# Patient Record
Sex: MALE | Race: WHITE | NOT HISPANIC OR LATINO | Employment: OTHER | URBAN - METROPOLITAN AREA
[De-identification: names, ages, dates, MRNs, and addresses within clinical notes are randomized per-mention and may not be internally consistent; named-entity substitution may affect disease eponyms.]

---

## 2018-01-23 ENCOUNTER — OFFICE VISIT CONVERTED (OUTPATIENT)
Dept: PULMONOLOGY | Facility: CLINIC | Age: 53
End: 2018-01-23
Attending: INTERNAL MEDICINE

## 2018-03-02 ENCOUNTER — OFFICE VISIT CONVERTED (OUTPATIENT)
Dept: SURGERY | Facility: CLINIC | Age: 53
End: 2018-03-02
Attending: SURGERY

## 2018-03-13 ENCOUNTER — OFFICE VISIT CONVERTED (OUTPATIENT)
Dept: PULMONOLOGY | Facility: CLINIC | Age: 53
End: 2018-03-13
Attending: INTERNAL MEDICINE

## 2018-12-19 ENCOUNTER — CONVERSION ENCOUNTER (OUTPATIENT)
Dept: OTHER | Facility: HOSPITAL | Age: 53
End: 2018-12-19

## 2019-02-08 ENCOUNTER — OFFICE VISIT CONVERTED (OUTPATIENT)
Dept: CARDIOLOGY | Facility: CLINIC | Age: 54
End: 2019-02-08
Attending: INTERNAL MEDICINE

## 2021-05-15 VITALS
DIASTOLIC BLOOD PRESSURE: 86 MMHG | SYSTOLIC BLOOD PRESSURE: 144 MMHG | HEART RATE: 72 BPM | WEIGHT: 228 LBS | HEIGHT: 67 IN | BODY MASS INDEX: 35.79 KG/M2

## 2021-05-15 VITALS
DIASTOLIC BLOOD PRESSURE: 80 MMHG | SYSTOLIC BLOOD PRESSURE: 134 MMHG | BODY MASS INDEX: 35.63 KG/M2 | HEART RATE: 100 BPM | WEIGHT: 227 LBS | HEIGHT: 67 IN

## 2021-05-16 VITALS — RESPIRATION RATE: 14 BRPM | HEIGHT: 67 IN | WEIGHT: 212 LBS | BODY MASS INDEX: 33.27 KG/M2

## 2021-05-28 VITALS
SYSTOLIC BLOOD PRESSURE: 156 MMHG | TEMPERATURE: 98.7 F | DIASTOLIC BLOOD PRESSURE: 95 MMHG | SYSTOLIC BLOOD PRESSURE: 132 MMHG | BODY MASS INDEX: 34.92 KG/M2 | BODY MASS INDEX: 33.2 KG/M2 | WEIGHT: 222.5 LBS | RESPIRATION RATE: 16 BRPM | OXYGEN SATURATION: 100 % | OXYGEN SATURATION: 100 % | HEIGHT: 67 IN | DIASTOLIC BLOOD PRESSURE: 84 MMHG | HEIGHT: 67 IN | HEART RATE: 92 BPM | HEART RATE: 93 BPM | RESPIRATION RATE: 12 BRPM | TEMPERATURE: 98 F | WEIGHT: 211.56 LBS

## 2021-05-28 NOTE — PROGRESS NOTES
Patient: ELBA HINES     Acct: [id number]     Report: #[id number]  UNIT #: [id number]     : 1965    Encounter Date:2018  PRIMARY CARE: Richard Bello  ***Signed***  --------------------------------------------------------------------------------------------------------------------  Chief Complaint      Encounter Date      2018            Referring Provider      Richard Bello            Patient Complaint      Patient is complaining of      2 month follow up            VITALS      Height 5 ft 7 in / 170.18 cm      Weight 211 lbs 9 oz / 95.484911 kg      BSA 2.16 m2      BMI 33.1 kg/m2      Temperature 98.0 F / 36.67 C - Oral      Pulse 93      Respirations 16      Blood Pressure 132/84 Sitting, Right Arm      Pulse Oximetry 100%, room air      Exhaled Nitrous Oxide Testin            HPI      The patient is a very pleasant 52 year old  male cigarettes smoker     with asthma/chronic obstructive pulmonary disease overlap syndrome here for     follow up.             Since his last office visit I put him on Perforomist, Pulmicort and Spiriva. He     still continues to smoke. He still has significant respiratory symptoms. The     last time I saw him he was treated for an asthma exacerbation. He still gets     short of breath walking about 100-150 feet, worse with exertion, relieved with     rest. He has chronic cough productive of clear thin sputum. All symptoms are     worse with exertion and relieved with nebulizers. He is seasonal allergies are     well controlled and he denies any itchy, scratchy throat and watery eyes. He     has bad rhinorrhea with nasal congestion as well as a salty taste in the back     of his throat with postnasal drip. His cough is worse with nasal stuffiness. He     is not on any nasal sprays. He had previously seen allergy and immunology for     allergy shots before. Since his last office visit I did a CBC showing     peripheral eosinophilia. I also did an IgE  which was 724. He denies any     exposures to mold, hay or dust but has no CT scan evidence of bronchiectasis     and has never been evaluated for allergic bronchopulmonary aspergillosis. He     denies any nausea and vomiting, fevers or chills, headaches or hemoptysis or     chest pain. He denies any swollen lymph nodes or glands in her head and neck.     He still smokes half a pack of cigarettes a day and has done so for 40 years     and has no interest in quitting.             I have personally reviewed the Review of Systems, past family, social, surgical     and medical histories and I agree with the findings.            ROS      Constitutional:  Complains of: Fatigue, Denies: Fever, Weight gain, Weight loss    , Chills, Insomnia, Other      Respiratory/Breathing:  Complains of: Shortness of air, Wheezing, Cough, Denies    : Hemoptysis, Pleuritic pain, Other      Endocrine:  Denies: Polydipsia, Polyuria, Heat/cold intolerance, Diabetes, Other      Eyes:  Denies: Blurred vision, Vision Changes, Other      Ears, nose, mouth, throat:  Denies: Mouth lesions, Thrush, Throat pain,     Hoarseness, Allergies/Hay Fever, Post Nasal Drip, Headaches, Recent Head Injury    , Nose Bleeding, Neck Stiffness, Thyroid Mass, Hearing Loss, Ear Fullness, Dry     Mouth, Nasal or Sinus Pain, Dry Lips, Nasal discharge, Nasal congestion, Other      Cardiovascular:  Denies: Palpitations, Syncope, Claudication, Chest Pain, Wake     up Gasping for air, Leg Swelling, Irregular Heart Rate, Cyanosis, Dyspnea on     Exertion, Other      Gastrointestinal:  Denies: Nausea, Constipation, Diarrhea, Abdominal pain,     Vomiting, Difficulty Swallowing, Reflux/Heartburn, Dysphagia, Jaundice, Bloating    , Melena, Bloody stools, Other      Genitourinary:  Denies: Urinary frequency, Incontinence, Hematuria, Urgency,     Nocturia, Dysuria, Testicular problems, Other      Musculoskeletal:  Denies: Joint Pain, Joint Stiffness, Joint Swelling, Myalgias    ,  Other      Hematologic/lymphatic:  DENIES: Lymphadenopathy, Bruising, Bleeding tendencies,     Other      Neurological:  Denies: Headache, Numbness, Weakness, Seizures, Other      Psychiatric:  Denies: Anxiety, Appropriate Effect, Depression, Other      Sleep:  No: Excessive daytime sleep, Morning Headache?, Snoring, Insomnia?,     Stop breathing at sleep?, Other      Integumentary:  Denies: Rash, Dry skin, Skin Warm to Touch, Other      Immunologic/Allergic:  Denies: Latex allergy, Seasonal allergies, Asthma,     Urticaria, Eczema, Other      Immunization status:  No: Up to date            FAMILY/SOCIAL/MEDICAL HX      Surgical History:  No: AAA Repair, Abdominal Surgery, Angioplasty, Appendectomy    , Back Surgery, Bladder Surgery, Bowel Surgery, Breast Surgery, CABG, Carotid     Stenosis, Cholecystectomy, Ear Surgery, Eye Surgery, Head Surgery, Hernia     Surgery, Kidney Surgery, Nose Surgery, Oral Surgery, Orthopedic Surgery,     Prostatectomy, Rectal Surgery, Spinal Surgery, Testicular Surgery, Throat     Surgery, Valve Replacement, Vascular Surgery, Other Surgeries      Stroke - Family Hx:  Grandparent      Heart - Family Hx:  Father, Brother      Cancer/Type - Family Hx:  Aunt, Uncle, Cousin      Is Mother Still Living?:  Yes      Social History:  Tobacco Use, No Alcohol Use, No Recreational Drug use      Smoking status:  Current every day smoker (10 cigs per day x 40 y )      Anticoagulation Therapy:  No      Antibiotic Prophylaxis:  No      Medical History:  Yes: Asthma, Chronic Bronchitis/COPD, High Blood Pressure,     High Cholesterol, Shortness Of Breath, No: Alcoholism, Allergies, Anemia,     Arthritis, Blood Disease, Broken Bones, Cataracts, Chemical Dependency,     Chemotherapy/Cancer, Emphysema, Chronic Liver Disease, Colon Trouble, Colitis,     Diverticulitis, Congestive Heart Failu, Deafness or Ringing Ears, Convulsions,     Depression, Anxiety, Bipolar Disorder, Diabetes, Epilepsy, Seizures,      Forgetfullness, Glaucoma, Gall Stones, Gout, Head Injury, Heart Attack, Heart     Murmur, Hemorrhoids/Rectal Prob, Hepatitis, Hiatal Hernia, HIV (Do not ask -     volu, Jaundice, Kidney or Bladder Disease, Kidney Stones, Migrane Headaches,     Mitral Valve Prolapse, Night sweats, Phlebitis, Psychiatric Care, Reflux Disease    , Rheumatic Fever, Sexually Transmitted Dis, Sinus Trouble, Skin Disease/    Psoriais/Ecz, Stroke, Thyroid Problem, Tuberculosis or Pos TB Te, Miscellaneous     Medical/oth            Hx Influenza Vaccination:  Yes      Date Influenza Vaccine Given:  Jan 1, 2018      Influenza Vaccine Declined:  No      2 or More Falls Past Year?:  No      Fall Past Year with Injury?:  No      Hx Pneumococcal Vaccination:  No      Encouraged to follow-up with:  PCP regarding preventative exams.      Chart initiated by      madi pablo ma            ALLERGIES/MEDICATIONS      Allergies:        Coded Allergies:             ASPIRIN (Verified  Allergy, Unknown, 1/23/18)           DIPHENHYDRAMINE (Verified  Allergy, Unknown, 1/23/18)      Medications    Last Reconciled on 1/23/18 09:17 by GUSTAVO LAWTON MD      Azelastine/Fluticasone (Dymista Nasal Spray) 23 Gm Spray.pump      1 SPRAYS NARE EACH BID, #1 BOTTLE 5 Refills         Prov: GUSTAVO LAWTON         1/23/18       Tiotropium Bromide (Spiriva Respimat 2.5 mcg/Puff) 4 Gm Mist.inhal      2 PUFFS INH RTQDAY, #1 MDI 5 Refills         Prov: GUSTAVO LAWTON         1/23/18       Neb-Budesonide (Pulmicort) 0.5 Mg/2 Ml Ampul.neb      0.5 MG INH RTBID, #60 NEB 5 Refills         Prov: GUSTAVO LAWTON         1/23/18       Formoterol Fumarate (Perforomist) 20 Mcg/2 Ml Vial.neb      20 MCG INH BID, #60 NEB 5 Refills         Prov: GUSTAVO LAWTON         1/23/18       predniSONE* (Deltasone*) 10 Mg Tablet      10 MG PO ASDIR, #45 TAB 5 Refills         Prov: GUSTAVO LAWTON         11/2/17       Losartan Potassium (Cozaar) 50 Mg Tablet      50 MG PO QDAY, #30 TAB 0 Refills          Reported         3/28/17       Pravastatin Sodium (Pravastatin*) 80 Mg Tablet      80 MG PO QDAY, TAB         Reported         3/28/17       ClonazePAM (ClonazePAM) 0.5 Mg Tablet      0.5 MG PO TID, #90 TAB 0 Refills         Reported         1/9/17       Montelukast Sodium (Singulair*) 10 Mg Tablet      10 MG PO QDAY, #30 TAB 0 Refills         Reported         1/9/17       Cetirizine Hcl (ZyrTEC*) 5 Mg Tablet      10 MG PO QDAY for 30 Days, #60 TAB         Reported         1/9/17       Omeprazole (PriLOSEC*) 40 Mg Capsule.dr      40 MG PO QDAY for 30 Days, #30 CAP 0 Refills         Reported         1/9/17       MDI-Albuterol (Ventolin HFA*) 8 Gm Hfa.aer.ad      1 PUFFS INH Q4-6H Y for SHORTNESS OF BREATH, #1 MDI 0 Refills         Reported         1/9/17       Atenolol (Tenormin*) 25 Mg Tablet      1 TAB PO QDAY, TAB 0 Refills         Reported         11/27/09      Current Medications      Current Medications Reviewed 1/23/18            EXAM      Vital Signs Reviewed      Gen: WDWN, Alert, NAD.        HEENT:  PERRL, EOMI.  OP, nares clear, no sinus tenderness.      Neck:  Supple, no JVD, no thyromegaly.      Lymph: No axillary, cervical, supraclavicular lymphadenopathy noted bilaterally.      Chest:  Good aeration, coarse rhonchi and wheezing in all lung fields,      tympanic to percussion bilaterally, no work of breathing noted.      CV:  RRR, no MGR, pulses 2+, equal.      Abd:  Soft, NT, ND, + BS, no HSM.      EXT:  No clubbing, no cyanosis, no edema, no joint tenderness.       Neuro:  A  Skin: No rashes or lesions.      Vtials      Vitals:             Height 5 ft 7 in / 170.18 cm           Weight 211 lbs 9 oz / 95.298164 kg           BSA 2.16 m2           BMI 33.1 kg/m2           Temperature 98.0 F / 36.67 C - Oral           Pulse 93           Respirations 16           Blood Pressure 132/84 Sitting, Right Arm           Pulse Oximetry 100%, room air            REVIEW      Results Reviewed      [facility] Results  Reviewed?:  Yes Prev Lab Results, Yes Previous [facility] Records      Lab Results      I personally reviewed the patient's CBC showing peripheral eosinophilia and     elevated IgE of 724.      PFT Results      Patient: DUSTIN HINES   Acct #: [id number]         Report #: [id number]   : 1965 Report #: [id number]      MR #: [id number]   Location: [facility]                                ***Signed***            PFT      DATE: 4/7/15      PFT Results      Spirometry      FEV1/FVC 65%      FEV1 2.48 L 70% of predicted      FVC 3.78 L 83% of predicted      There is no significant response to bronchodilator therapy seen            Lung volumes      Total lung capacity 6.40 L 100% of predicted      Residual volume 2.55 L 135% of predicted      Expiratory reserve volume 72% of predicted            Diffusion      DLCO is 73% of predicted            Interpretation      Spirometry shows mild obstructive lung disease      There is no significant response to bronchodilator therapy seen      Lung volumes show mildly decreased expiratory reserve volume likely secondary     to body habitus      DLCO is very mildly reduced            Pietro Dubon 2015 20:39               <Electronically signed by Pietro Dubon DO> on 04/20/15 2039          on            PLAN      Assessment      FORD (dyspnea on exertion) - R06.09            Cough - R05            Wheeze - R06.2            Asthma-COPD overlap syndrome - J44.9            Seasonal allergic rhinitis       Chronic seasonal allergic rhinitis due to pollen - J30.1       Chronicity: chronic       Allergic rhinitis trigger: pollen            Seasonal allergies       Chronic seasonal allergic rhinitis due to pollen - J30.1       Chronicity: chronic       Allergic rhinitis trigger: pollen            Tobacco abuse - Z72.0            Elevated IgE level - R76.8            Notes      New Medications      * AZELASTINE/FLUTICASONE (Dymista Nasal Spray) 23 GM  SPRAY.PUMP: 1 SPRAYS NARE     EACH BID #1      Renewed Medications      * Formoterol Fumarate (Perforomist) 20 MCG/2 ML VIAL.NEB: 20 MCG INH BID #60       Dx: FORD (dyspnea on exertion) - R06.09      * Neb-Budesonide (Pulmicort) 0.5 MG/2 ML AMPUL.NEB: 0.5 MG INH RTBID #60       Instructions: DIAGNOSIS CODE REQUIRED PRIOR TO PRESCRIBING.       Dx: FORD (dyspnea on exertion) - R06.09      * TIOTROPIUM BROMIDE (Spiriva Respimat 2.5 mcg/Puff) 4 GM MIST.INHAL: 2 PUFFS     INH RTQDAY #1       Dx: FORD (dyspnea on exertion) - R06.09      Discontinued Medications      * Azithromycin Z-Abilio (Zithromax Z-Abilio) 250 MG TABLET: 250 MG PO ASDIR #1       Instructions: Take 500 mg (two tablets) by mouth the first day, then 250 mg (    one tablet) daily until all taken.       Dx: FORD (dyspnea on exertion) - R06.09      New Diagnostics      * PFT-Comp, PrePost,DLCO,BodyBox, Week       Dx: FORD (dyspnea on exertion) - R06.09      * Chest W/O Cont CT, Month       Dx: FORD (dyspnea on exertion) - R06.09      * Immunoglobulin  E (I, Week       Dx: FORD (dyspnea on exertion) - R06.09      * Rast Aspergillus Fum IGE, Routine       Dx: FORD (dyspnea on exertion) - R06.09      * CBC, Month       Dx: FORD (dyspnea on exertion) - R06.09      * Fungal Antibodies Cs, Routine       Dx: FORD (dyspnea on exertion) - R06.09      * Fungal Serology Prof, Routine       Dx: FORD (dyspnea on exertion) - R06.09      * ASPERGILLUS AB QN DID APGAQ, Routine       Dx: FORD (dyspnea on exertion) - R06.09      IMPRESSION:      1. Dyspnea.       2. Cough.       3. Wheeze.      4. Severe asthma and chronic obstructive pulmonary disease overlap syndrome.     Patient with mild airflow obstruction, significant allergies and rhinitis     symptoms as well as peripheral eosinophilia and IgE of 724. The patient is     poorly controlled on triple inhaler therapy and Singulair. Asthma control test     score is 4 today signifying very poor control of underlying disease. Given his      high IgE we will need to rule out allergic bronchopulmonary aspergillosis or     SAFS and he may also possibly benefit from anti-IL5 or anti-IgE therapy.       5. Seasonal allergies well controlled.       6. Seasonal allergic rhinitis poorly controlled.       7. Tobacco abuse of cigarettes.       8.  Alpha-1 antitrypsin testing was reviewed, genotype MM.             PLAN:      1. Continue Perforomist and Pulmicort nebulizer twice daily. Continue Spiriva     Respimat 2.5 two puffs daily.        2.  Check noncontrast chest CT scan to evaluate for bronchiectasis.       3. Check CBC, IgE, aspergillus antibodies, fungal serologies, RAST for     aspergillus IgE to rule out allergic bronchopulmonary aspergillosis, allergic     bronchopulmonary mycosis and SAFS. If there is allergic bronchopulmonary     aspergillosis we will need to treat accordingly. If there is no evidence of     such I think the patient would either benefit for either anti-IL5 or anti-IgE     therapy to augment his poorly controlled asthma/chronic obstructive pulmonary     disease overlap syndrome.       4. Continue Singulair and Zyrtec.       5. Start Dymista nasal spray. I discussed how to use with the patient.       6. Alpha-1 antitrypsin testing was reviewed, genotype MM.       7. Smoking cessation discussed. He will continue to cut back by 1 cigarette     every day.       8.  Up to date with flu vaccine. We will do Pneumovax at the next office visit,     Prevnar when he is 64.       9. Follow up in 1-2 months.            Patient Education      Other Education:  asthma, elevated IgE                 Disclaimer: Converted document may not contain table formatting or lab diagrams. Please see [employer] [facility] for the authenticated document.

## 2021-05-28 NOTE — PROGRESS NOTES
Patient: ELBA HINES     Acct: [id number]     Report: #[id number]  UNIT #: [id number]     : 1965    Encounter Date:2018  PRIMARY CARE: Richard Bello  ***Signed***  --------------------------------------------------------------------------------------------------------------------  Chief Complaint      Encounter Date      Mar 13, 2018            Referring Provider      Richard Bello            Patient Complaint      Patient is complaining of      2 mo f/u            VITALS      Height 5 ft 7 in / 170.18 cm      Weight 222 lbs 8 oz / 100.306310 kg      BSA 2.22 m2      BMI 34.8 kg/m2      Temperature 98.7 F / 37.06 C - Oral      Pulse 92      Respirations 12      Blood Pressure 156/95 Sitting, Left Arm      Pulse Oximetry 100%, roomair      Exhaled Nitrous Oxide Testin            HPI      The patient is a very pleasant 52 year old  male cigarette smoker with     asthma/chronic obstructive pulmonary disease overlap syndrome here for follow     up.             Since his last office visit he has been on triple inhaler therapy. He is still     smoking. He is complaining of increasing swelling in his belly and his legs.     Chest CT scan showed no pulmonary edema but some chronic interstitial lung     markings and possible bronchiectasis. He does have an IgE of 724. I sent the     patient for fungal testing however the patient did not have this done. He has     not had any RAST testing for aspergillus or fungal serologies or aspergillus     antibodies. He does have significant peripheral eosinophilia. He takes     Singulair.  He denies any itchy, scratchy throat and watery eyes or nasal     congestion. He is still very short of breath walking  feet. He has     significant wheezing and wet cough productive of thin sputum throughout the     entire day. He still smokes 5-10 cigarettes a day and has done so for the last     40 years.  He denies any nausea and vomiting, fevers or chills,  headaches or     hemoptysis or chest pains. He denies any rashes, swollen glands or lymph nodes     in the head or neck. He is able to perform his activities of daily living     without difficulty.             I stopped the patient's steroids at the last office visit but his primary care     provider has also given him some.  He has been on steroids more than half of     the time for the last 3-4 months which may account for some of his swelling. \            I have personally reviewed the Review of Systems, past family, social, surgical     and medical histories and I agree with the findings.            ROS      Constitutional:  Denies: Fatigue, Fever, Weight gain, Weight loss, Chills,     Insomnia, Other      Respiratory/Breathing:  Complains of: Shortness of air, Denies: Wheezing, Cough    , Hemoptysis, Pleuritic pain, Other      Endocrine:  Denies: Polydipsia, Polyuria, Heat/cold intolerance, Diabetes, Other      Eyes:  Complains of: Vision Changes, Denies: Blurred vision, Other      Ears, nose, mouth, throat:  Complains of: Hoarseness, Denies: Mouth lesions,     Thrush, Throat pain, Allergies/Hay Fever, Post Nasal Drip, Headaches, Recent     Head Injury, Nose Bleeding, Neck Stiffness, Thyroid Mass, Hearing Loss, Ear     Fullness, Dry Mouth, Nasal or Sinus Pain, Dry Lips, Nasal discharge, Nasal     congestion, Other      Cardiovascular:  Complains of: Leg Swelling, Other, Denies: Palpitations,     Syncope, Claudication, Chest Pain, Wake up Gasping for air, Irregular Heart Rate    , Cyanosis, Dyspnea on Exertion      Gastrointestinal:  Denies: Nausea, Constipation, Diarrhea, Abdominal pain,     Vomiting, Difficulty Swallowing, Reflux/Heartburn, Dysphagia, Jaundice, Bloating    , Melena, Bloody stools, Other      Genitourinary:  Denies: Urinary frequency, Incontinence, Hematuria, Urgency,     Nocturia, Dysuria, Testicular problems, Other      Musculoskeletal:  Denies: Joint Pain, Joint Stiffness, Joint Swelling,  Myalgias    , Other      Hematologic/lymphatic:  DENIES: Lymphadenopathy, Bruising, Bleeding tendencies,     Other      Neurological:  Complains of: Headache, Denies: Numbness, Weakness, Seizures,     Other      Psychiatric:  Denies: Anxiety, Appropriate Effect, Depression, Other      Sleep:  No: Excessive daytime sleep, Morning Headache?, Snoring, Insomnia?,     Stop breathing at sleep?, Other      Integumentary:  Denies: Rash, Dry skin, Skin Warm to Touch, Other      Immunologic/Allergic:  Denies: Latex allergy, Seasonal allergies, Asthma,     Urticaria, Eczema, Other      Immunization status:  No: Up to date            FAMILY/SOCIAL/MEDICAL HX      Surgical History:  No: AAA Repair, Abdominal Surgery, Angioplasty, Appendectomy    , Back Surgery, Bladder Surgery, Bowel Surgery, Breast Surgery, CABG, Carotid     Stenosis, Cholecystectomy, Ear Surgery, Eye Surgery, Head Surgery, Hernia     Surgery, Kidney Surgery, Nose Surgery, Oral Surgery, Orthopedic Surgery,     Prostatectomy, Rectal Surgery, Spinal Surgery, Testicular Surgery, Throat     Surgery, Valve Replacement, Vascular Surgery, Other Surgeries      Stroke - Family Hx:  Grandparent      Heart - Family Hx:  Father, Brother      Cancer/Type - Family Hx:  Aunt, Uncle, Cousin      Is Mother Still Living?:  Yes      Social History:  Tobacco Use, No Alcohol Use, No Recreational Drug use      Smoking status:  Current every day smoker (10 cigs per day x 40 y )      Anticoagulation Therapy:  No      Antibiotic Prophylaxis:  No      Medical History:  Yes: Asthma, Chronic Bronchitis/COPD, High Blood Pressure,     High Cholesterol, Shortness Of Breath, No: Alcoholism, Allergies, Anemia,     Arthritis, Blood Disease, Broken Bones, Cataracts, Chemical Dependency,     Chemotherapy/Cancer, Emphysema, Chronic Liver Disease, Colon Trouble, Colitis,     Diverticulitis, Congestive Heart Failu, Deafness or Ringing Ears, Convulsions,     Depression, Anxiety, Bipolar Disorder,  Diabetes, Epilepsy, Seizures,     Forgetfullness, Glaucoma, Gall Stones, Gout, Head Injury, Heart Attack, Heart     Murmur, Hemorrhoids/Rectal Prob, Hepatitis, Hiatal Hernia, HIV (Do not ask -     volu, Jaundice, Kidney or Bladder Disease, Kidney Stones, Migrane Headaches,     Mitral Valve Prolapse, Night sweats, Phlebitis, Psychiatric Care, Reflux Disease    , Rheumatic Fever, Sexually Transmitted Dis, Sinus Trouble, Skin Disease/    Psoriais/Ecz, Stroke, Thyroid Problem, Tuberculosis or Pos TB Te, Miscellaneous     Medical/oth            Hx Influenza Vaccination:  Yes      Date Influenza Vaccine Given:  Jan 1, 2018      Influenza Vaccine Declined:  No      2 or More Falls Past Year?:  No      Fall Past Year with Injury?:  No      Hx Pneumococcal Vaccination:  No      Encouraged to follow-up with:  PCP regarding preventative exams.      Chart initiated by      kaelyn burleson/ ma            ALLERGIES/MEDICATIONS      Allergies:        Coded Allergies:             ASPIRIN (Verified  Allergy, Unknown, 3/13/18)           DIPHENHYDRAMINE (Verified  Allergy, Unknown, 3/13/18)      Medications    Last Reconciled on 3/13/18 16:32 by GUSTAVO LAWTON MD      Furosemide (Furosemide) 20 Mg Tablet      20 MG PO QDAY, #5 TAB 0 Refills         Prov: GUSTAVO LAWTON         3/13/18       Azelastine Hcl (Azelastine Nasal*) 137 Mcg/0.137 Ml Spray.pump      2 PUFFS NARE EACH BID, #1 BOTTLE 3 Refills         Prov: GUSTAVO LAWTON         1/29/18       Yohan-Fluticasone (Fluticasone 50 mcg) 16 Gm Spray.susp      2 PUFFS NARE EACH QDAY, #1 BOTTLE 4 Refills         Prov: GUSTAVO LAWTON         1/29/18       Tiotropium Bromide (Spiriva Respimat 2.5 mcg/Puff) 4 Gm Mist.inhal      2 PUFFS INH RTQDAY, #1 MDI 5 Refills         Prov: GUSTAVO LAWTON         1/23/18       Neb-Budesonide (Pulmicort) 0.5 Mg/2 Ml Ampul.neb      0.5 MG INH RTBID, #60 NEB 5 Refills         Prov: GUSTAVO LAWTON         1/23/18       Formoterol Fumarate (Perforomist) 20 Mcg/2  Ml Vial.neb      20 MCG INH BID, #60 NEB 5 Refills         Prov: GUSTAVO LAWTON         1/23/18       Losartan Potassium (Cozaar) 50 Mg Tablet      50 MG PO QDAY, #30 TAB 0 Refills         Reported         3/28/17       Pravastatin Sodium (Pravastatin*) 80 Mg Tablet      80 MG PO QDAY, TAB         Reported         3/28/17       ClonazePAM (ClonazePAM) 0.5 Mg Tablet      0.5 MG PO TID, #90 TAB 0 Refills         Reported         1/9/17       Montelukast Sodium (Singulair*) 10 Mg Tablet      10 MG PO QDAY, #30 TAB 0 Refills         Reported         1/9/17       Cetirizine Hcl (ZyrTEC*) 5 Mg Tablet      10 MG PO QDAY for 30 Days, #60 TAB         Reported         1/9/17       Omeprazole (PriLOSEC*) 40 Mg Capsule.dr      40 MG PO QDAY for 30 Days, #30 CAP 0 Refills         Reported         1/9/17       MDI-Albuterol (Ventolin HFA*) 8 Gm Hfa.aer.ad      1 PUFFS INH Q4-6H Y for SHORTNESS OF BREATH, #1 MDI 0 Refills         Reported         1/9/17       Atenolol (Tenormin*) 25 Mg Tablet      1 TAB PO QDAY, TAB 0 Refills         Reported         11/27/09      Current Medications      Current Medications Reviewed 3/13/18            EXAM      Vital Signs Reviewed      Gen: WDWN, Alert, NAD.        HEENT:  PERRL, EOMI.  OP, nares clear, no sinus tenderness.      Neck:  Supple, no JVD, no thyromegaly.      Lymph: No axillary, cervical, supraclavicular lymphadenopathy noted bilaterally.      Chest:  Good aeration, coarse rhonchi and wheezing in all lung fields,      tympanic to percussion bilaterally, no work of breathing noted.      CV:  RRR, no MGR, pulses 2+, equal.      Abd:  Soft, NT, ND, + BS, no HSM.      EXT:  No clubbing, no cyanosis, no edema, no joint tenderness.       Neuro:  A  Skin: No rashes or lesions.      Vtials      Vitals:             Height 5 ft 7 in / 170.18 cm           Weight 222 lbs 8 oz / 100.131967 kg           BSA 2.22 m2           BMI 34.8 kg/m2           Temperature 98.7 F / 37.06 C - Oral            Pulse 92           Respirations 12           Blood Pressure 156/95 Sitting, Left Arm           Pulse Oximetry 100%, roomair            REVIEW      Results Reviewed      PCCS Results Reviewed?:  Yes Prev Radiology Results, Yes Previous [facility] Records      Radiographic Results               [facility] Diagnostic Img                PACS RADIOLOGY REPORT            Patient: DUSTIN HINES   Acct: #[id number]   Report: #[id number]            UNIT #: [id number]    DOS: 18 1319      INSURANCE:[employer]   ORDER #:[id number]      LOCATION:[place]     : 1965            PROVIDERS      ADMITTING:     ATTENDING: GUSTAVO LAWTON      FAMILY:  NONE,MD   ORDERING:  GUSTAVO LAWTON         OTHER:    DICTATING:  Christofer Lloyd MD            REQ #:[id number]   EXAM:WO - CT CHEST without CONTRAST      REASON FOR EXAM:        REASON FOR VISIT:  COUGH            *******Signed******         PROCEDURE:   CT CHEST WITHOUT CONTRAST             COMPARISON:   [facility], CT, CHEST W/O CONTRAST, 10/12/2010, 13:    14.             INDICATIONS:   COUGHING, SOA, COUGHING, SMOKER             TECHNIQUE:   CT images were created without the administration of contrast     material.               PROTOCOL:     Standard imaging protocol performed                RADIATION:     DLP: 635mGy*cm          Automated exposure control was utilized to minimize radiation dose.              FINDINGS:         No pleural or pericardial effusion is evident.  No adenopathy is evident.  The     lungs are clear       bilaterally.  Mild paraseptal emphysematous changes are noted.             There is diffuse fatty infiltration of the liver.  No focal osseous lesion is     seen.             CONCLUSION:         1. Mild paraseptal emphysematous changes.      2. Diffuse fatty infiltration of the liver              Christofer Lloyd M.D.             Electronically Signed and Approved By: Christofer  KELSEY Lloyd on 3/02/2018 at 13:56                   Until signed, this is an unconfirmed preliminary report that may contain      errors and is subject to change.                                              SOPHIERRO:      D:03/02/18 1356            PLAN      Assessment      Notes      New Medications      * Furosemide 20 MG TABLET: 20 MG PO QDAY #5      Discontinued Medications      * predniSONE* (Deltasone*) 10 MG TABLET: 10 MG PO ASDIR #45       Instructions: 95asw5o,87lrh0z,44rot7m,98lur8t,31pyl2a       Dx: FORD (dyspnea on exertion) - R06.09      IMPRESSION:      1. Dyspnea.       2. Cough.       3. Wheeze.       4. Severe persistent asthma with asthma chronic obstructive pulmonary disease     overlap syndrome. He does have mild airflow obstruction, significant allergies     and rhinitis, peripheral eosinophilia and IgE is 724. Poorly controlled on     triple inhaler therapy and Singulair. Asthma control test score is unchanged at     4 today. Given his high IgE I attempted to rule out allergic bronchopulmonary     aspergillosis and allergic bronchopulmonary mycosis serologically however he     did not have any of this done. I think at this point he would benefit from     bronchoscopy and anti-IgE therapy.       5. Seasonal allergies well controlled.       6. Allergic rhinitis well controlled.       7. Tobacco abuse of cigarettes.             PLAN:      1. Continue Perforomist and Pulmicort nebulizers twice daily, continue Spiriva     Respimat 2.5 twice daily, continue Singulair and Zyrtec and continue Dymista.       2. I reviewed high IgE and chest CT scan. We will need to rule out atypical     infection including fungal infection. We will perform bronchoscopy with     bronchoalveolar lavage and endobronchial biopsies. Risks and benefits were     discussed with the patient and he is willing to undergo the procedure.       3. We will begin the process of starting the patient on anti-IgE therapy with     Xolair for  severe persistent allergic asthma.       4. Smoking cessation counseling provided. I spent 4 minutes today counseling     the patient on risks of smoking, including throat cancer, lung cancer, COPD,     heart disease and death. I also discussed the benefits of quitting. Smoking     cessation will be paramount to stopping the progression of the disease.       5. Up to date with flu vaccine. We will do Prevnar at the next visit with     Pneumovax 1 year afterwards.       6. Patient to follow up with me 1-2 weeks after bronchoscopy results.            Patient Education      Patient Education Provided:  Bronchoscopy            Patient Education:        Omalizumab Injection            Procedure Orders      Get Consent signed for:        bronchoscopy with biopsy, brushings, lavage      Risks and Benefits:        pneumothorax, bleeding, death                 Disclaimer: Converted document may not contain table formatting or lab diagrams. Please see [employer] [facility] for the authenticated document.

## 2022-02-14 ENCOUNTER — OFFICE VISIT (OUTPATIENT)
Dept: ORTHOPEDIC SURGERY | Facility: CLINIC | Age: 57
End: 2022-02-14

## 2022-02-14 VITALS — BODY MASS INDEX: 37.59 KG/M2 | HEIGHT: 67 IN | WEIGHT: 239.5 LBS | OXYGEN SATURATION: 94 % | HEART RATE: 76 BPM

## 2022-02-14 DIAGNOSIS — M87.00 AVN (AVASCULAR NECROSIS OF BONE): Primary | ICD-10-CM

## 2022-02-14 DIAGNOSIS — M25.551 RIGHT HIP PAIN: ICD-10-CM

## 2022-02-14 DIAGNOSIS — M16.11 PRIMARY OSTEOARTHRITIS OF RIGHT HIP: ICD-10-CM

## 2022-02-14 DIAGNOSIS — R60.9 PERIPHERAL EDEMA: ICD-10-CM

## 2022-02-14 PROCEDURE — 99204 OFFICE O/P NEW MOD 45 MIN: CPT | Performed by: STUDENT IN AN ORGANIZED HEALTH CARE EDUCATION/TRAINING PROGRAM

## 2022-02-14 RX ORDER — MONTELUKAST SODIUM 10 MG/1
TABLET ORAL
COMMUNITY

## 2022-02-14 RX ORDER — ATENOLOL 50 MG/1
TABLET ORAL
COMMUNITY
Start: 2021-12-29

## 2022-02-14 RX ORDER — HYDROCODONE BITARTRATE AND ACETAMINOPHEN 5; 325 MG/1; MG/1
TABLET ORAL
COMMUNITY
End: 2022-08-05

## 2022-02-14 RX ORDER — CETIRIZINE HYDROCHLORIDE 10 MG/1
TABLET ORAL
COMMUNITY

## 2022-02-14 RX ORDER — ALBUTEROL SULFATE 2.5 MG/3ML
3 SOLUTION RESPIRATORY (INHALATION)
COMMUNITY

## 2022-02-14 RX ORDER — DULOXETIN HYDROCHLORIDE 30 MG/1
CAPSULE, DELAYED RELEASE ORAL
COMMUNITY
Start: 2022-01-24

## 2022-02-14 RX ORDER — OMEPRAZOLE 20 MG/1
CAPSULE, DELAYED RELEASE ORAL
COMMUNITY
Start: 2021-12-29

## 2022-02-14 RX ORDER — ALBUTEROL SULFATE 90 UG/1
AEROSOL, METERED RESPIRATORY (INHALATION)
COMMUNITY
Start: 2021-12-29

## 2022-02-14 RX ORDER — ARIPIPRAZOLE 10 MG/1
TABLET ORAL
COMMUNITY

## 2022-02-14 RX ORDER — PRAVASTATIN SODIUM 80 MG/1
TABLET ORAL
COMMUNITY
Start: 2021-12-29

## 2022-02-14 RX ORDER — CLONAZEPAM 0.5 MG/1
TABLET, ORALLY DISINTEGRATING ORAL
COMMUNITY
Start: 2021-12-29

## 2022-02-14 RX ORDER — LOSARTAN POTASSIUM AND HYDROCHLOROTHIAZIDE 12.5; 5 MG/1; MG/1
TABLET ORAL
COMMUNITY
Start: 2021-12-29

## 2022-02-14 RX ORDER — IBUPROFEN 600 MG/1
TABLET ORAL
COMMUNITY
Start: 2021-12-29 | End: 2022-02-28

## 2022-02-14 NOTE — PROGRESS NOTES
"Chief Complaint  Pain of the Right Hip    Subjective          Jose Rafael Ortiz presents to [facility] for   History of Present Illness    The patient presents here today for evaluation of the right hip. The patient is ambulating with a cane. He reports 6 months ago he felt a pull in his hip after a long step. He reports the pain has gotten worse. He had no previous pain. He reports relief with heat. He locates the pain to the anterior hip and the groin. He has no other complaints. He denies numbness and tingling. The patient is a smoker. He quit drinking in 2014. He takes prednisone for COPD. He reports a history of lower extremity circulation issues and a poorly described cardiac history. He denies history of DVT.     Allergies   Allergen Reactions   • Benadryl [Diphenhydramine] Anaphylaxis   • Aspirin GI Intolerance        Social History     Socioeconomic History   • Marital status:    Tobacco Use   • Smoking status: Current Every Day Smoker     Packs/day: 0.25     Types: Cigarettes   • Smokeless tobacco: Never Used   Vaping Use   • Vaping Use: Never used   Substance and Sexual Activity   • Alcohol use: Not Currently   • Drug use: Never        I reviewed the patient's chief complaint, history of present illness, review of systems, past medical history, surgical history, family history, social history, medications, and allergy list.     REVIEW OF SYSTEMS    Constitutional: Denies fevers, chills, weight loss  Cardiovascular: Denies chest pain, shortness of breath  Skin: Denies rashes, acute skin changes  Neurologic: Denies headache, loss of consciousness  MSK: Right hip pain      Objective   Vital Signs:   Pulse 76   Ht 170.2 cm (67\")   Wt 109 kg (239 lb 8 oz)   SpO2 94%   BMI 37.51 kg/m²     Body mass index is 37.51 kg/m².    Physical Exam    General: Alert. No acute distress.   Right hip- antalgic gait. Limited hip ROM. Pain with hip ROM. Internal rotation neutral. External " Rotation 20. Full knee extension. No knee effusion or joint line pain. Intact extensor mechanism. Pre tibial edema. Calf soft. Positive EHL, FHL, GS and TA. Sensation intact to all 5 nerves of the foot. Weakly palpable posterior tibial pulse.  Pain and weakness with hip ROM. 4/5 hip Abduction     Left hip- no pain with hip ROM.     Procedures    Imaging Results (Most Recent)     Procedure Component Value Units Date/Time    XR Hip With or Without Pelvis 2 - 3 View Right [[id number]] Resulted: 02/14/22 1710     Updated: 02/14/22 1711    Narrative:      Indications: Right hip pain    Views: AP pelvis, AP and frog lateral right hip    Findings: Advanced avascular necrosis is present in the right femoral   head.  Subchondral collapse along the femoral head with sclerosis and   cystic changes seen.  Peripheral osteophyte formation noted along the   acetabular margin.  The hip is reduced.  No arthritic changes are seen in   the left hip.  Pelvic ring is intact.    Comparative Data: Comparative data found and reviewed today                     Assessment and Plan    Diagnoses and all orders for this visit:    1. AVN (avascular necrosis of bone) (HCC) (Primary)  -     CBC & Differential; Future  -     Comprehensive Metabolic Panel; Future  -     Hemoglobin A1c; Future  -     Vitamin D 25 Hydroxy; Future  -     Doppler Arterial Multi Level Lower Extremity - Bilateral CAR; Future    2. Right hip pain  -     XR Hip With or Without Pelvis 2 - 3 View Right    3. Primary osteoarthritis of right hip    4. Peripheral edema  -     Ambulatory Referral to Cardiology  -     Doppler Arterial Multi Level Lower Extremity - Bilateral CAR; Future        Discussed the treatment options with the patient, operative vs non-operative. Discussed the risks and benefits of a Right Total Hip Arthroplasty. The patient expressed understanding and wished to proceed. Advised the patient he had to quit smoking prior to surgery. We also discussed weight  loss with the patient. Order for lab work given today. He will also need vascular/cardiac clearance. Order for a lower extremity vascular study placed today. Advised him to see Dr. Petty for cardiac clearance as well. I advised him to continue OTC Nsaids. We will continue non operative management until smoking cessation and preoperative clearance achieved.     Call or return if symptoms worsen or patient has any concerns.   Will obtain X-Rays of Right hip at next visit.     Scribed for Edgar Dunham MD by Edgar Dunham MD  02/14/2022   13:25 EST         Follow Up   2 weeks    Patient was given instructions and counseling regarding his condition or for health maintenance advice. Please see specific information pulled into the AVS if appropriate.       I have personally performed the services described in this document as scribed by the above individual and it is both accurate and complete.     Edgar Dunham MD  02/15/22  13:08 EST

## 2022-02-24 ENCOUNTER — APPOINTMENT (OUTPATIENT)
Dept: CARDIOLOGY | Facility: HOSPITAL | Age: 57
End: 2022-02-24

## 2022-02-28 ENCOUNTER — TELEPHONE (OUTPATIENT)
Dept: ORTHOPEDIC SURGERY | Facility: CLINIC | Age: 57
End: 2022-02-28

## 2022-02-28 DIAGNOSIS — M87.00 AVN (AVASCULAR NECROSIS OF BONE): ICD-10-CM

## 2022-02-28 NOTE — TELEPHONE ENCOUNTER
PATIENT CALLED STATING HE HAD TO RESCHEDULE HIS DOPPLER TEST AND TODAY'S FOLLOW UP. PATIENT IS REQUESTING SOMETHING STRONGER FOR PAIN THE OTC IBUPROFEN IS NOT HELPING WITH PAIN. PER DR CHAPA ADVISED PATIENT WE CAN SEND DICLOFENAC 50 MG TO HIS PHARMACY. PATIENT VERBALIZED UNDERSTANDING.

## 2022-02-28 NOTE — TELEPHONE ENCOUNTER
Caller: ELBA HINES      Best call back number: [phone]    Chief complaint: R HIP     Type of visit:  2 WK FU    Requested date: AFTER DOPPLER 3/9      If rescheduling, when is the original appointment: 2.28.22     Additional notes: PLEASE ADVISE IF APPT NEEDS TO BE RESCHEDULED FOR AFTER DOPPLER APPT OR IF PT CAN BE SEEN SOONER , PT CANNOT COME TO APPT TODAY 2.28.22 BUT HAD TO RESCHEDULE HIS DOPPLER AS WELL TO 3/9. UNABLE TO WT

## 2022-03-09 ENCOUNTER — HOSPITAL ENCOUNTER (OUTPATIENT)
Dept: CARDIOLOGY | Facility: HOSPITAL | Age: 57
Discharge: HOME | End: 2022-03-09

## 2022-03-09 ENCOUNTER — LAB (OUTPATIENT)
Dept: LAB | Facility: HOSPITAL | Age: 57
End: 2022-03-09

## 2022-03-09 DIAGNOSIS — R60.0 EDEMA OF LEFT LOWER EXTREMITY: ICD-10-CM

## 2022-03-09 DIAGNOSIS — M79.604 BILATERAL LOWER EXTREMITY PAIN: Primary | ICD-10-CM

## 2022-03-09 DIAGNOSIS — M87.00 AVN (AVASCULAR NECROSIS OF BONE): ICD-10-CM

## 2022-03-09 DIAGNOSIS — M79.605 BILATERAL LOWER EXTREMITY PAIN: Primary | ICD-10-CM

## 2022-03-09 DIAGNOSIS — R60.9 PERIPHERAL EDEMA: ICD-10-CM

## 2022-03-09 LAB
ALBUMIN SERPL-MCNC: 4.6 G/DL (ref 3.5–5.2)
ALBUMIN/GLOB SERPL: 1.5 G/DL
ALP SERPL-CCNC: 107 U/L (ref 39–117)
ALT SERPL W P-5'-P-CCNC: 32 U/L (ref 1–41)
ANION GAP SERPL CALCULATED.3IONS-SCNC: 10.6 MMOL/L (ref 5–15)
AST SERPL-CCNC: 21 U/L (ref 1–40)
BASOPHILS # BLD AUTO: 0.06 10*3/MM3 (ref 0–0.2)
BASOPHILS NFR BLD AUTO: 0.8 % (ref 0–1.5)
BH CV LOWER VASCULAR LEFT COMMON FEMORAL AUGMENT: NORMAL
BH CV LOWER VASCULAR LEFT COMMON FEMORAL COMPETENT: NORMAL
BH CV LOWER VASCULAR LEFT COMMON FEMORAL COMPRESS: NORMAL
BH CV LOWER VASCULAR LEFT COMMON FEMORAL PHASIC: NORMAL
BH CV LOWER VASCULAR LEFT COMMON FEMORAL SPONT: NORMAL
BH CV LOWER VASCULAR LEFT DISTAL FEMORAL COMPRESS: NORMAL
BH CV LOWER VASCULAR LEFT GASTRONEMIUS COMPRESS: NORMAL
BH CV LOWER VASCULAR LEFT GREATER SAPH AK COMPRESS: NORMAL
BH CV LOWER VASCULAR LEFT GREATER SAPH BK COMPRESS: NORMAL
BH CV LOWER VASCULAR LEFT LESSER SAPH COMPRESS: NORMAL
BH CV LOWER VASCULAR LEFT MID FEMORAL AUGMENT: NORMAL
BH CV LOWER VASCULAR LEFT MID FEMORAL COMPETENT: NORMAL
BH CV LOWER VASCULAR LEFT MID FEMORAL COMPRESS: NORMAL
BH CV LOWER VASCULAR LEFT MID FEMORAL PHASIC: NORMAL
BH CV LOWER VASCULAR LEFT MID FEMORAL SPONT: NORMAL
BH CV LOWER VASCULAR LEFT PERONEAL COMPRESS: NORMAL
BH CV LOWER VASCULAR LEFT POPLITEAL AUGMENT: NORMAL
BH CV LOWER VASCULAR LEFT POPLITEAL COMPETENT: NORMAL
BH CV LOWER VASCULAR LEFT POPLITEAL COMPRESS: NORMAL
BH CV LOWER VASCULAR LEFT POPLITEAL PHASIC: NORMAL
BH CV LOWER VASCULAR LEFT POPLITEAL SPONT: NORMAL
BH CV LOWER VASCULAR LEFT POSTERIOR TIBIAL COMPRESS: NORMAL
BH CV LOWER VASCULAR LEFT PROXIMAL FEMORAL COMPRESS: NORMAL
BH CV LOWER VASCULAR LEFT SAPHENOFEMORAL JUNCTION COMPRESS: NORMAL
BH CV LOWER VASCULAR RIGHT COMMON FEMORAL AUGMENT: NORMAL
BH CV LOWER VASCULAR RIGHT COMMON FEMORAL COMPETENT: NORMAL
BH CV LOWER VASCULAR RIGHT COMMON FEMORAL COMPRESS: NORMAL
BH CV LOWER VASCULAR RIGHT COMMON FEMORAL PHASIC: NORMAL
BH CV LOWER VASCULAR RIGHT COMMON FEMORAL SPONT: NORMAL
BH CV LOWER VASCULAR RIGHT DISTAL FEMORAL COMPRESS: NORMAL
BH CV LOWER VASCULAR RIGHT GASTRONEMIUS COMPRESS: NORMAL
BH CV LOWER VASCULAR RIGHT GREATER SAPH AK COMPRESS: NORMAL
BH CV LOWER VASCULAR RIGHT GREATER SAPH BK COMPRESS: NORMAL
BH CV LOWER VASCULAR RIGHT LESSER SAPH COMPRESS: NORMAL
BH CV LOWER VASCULAR RIGHT MID FEMORAL AUGMENT: NORMAL
BH CV LOWER VASCULAR RIGHT MID FEMORAL COMPETENT: NORMAL
BH CV LOWER VASCULAR RIGHT MID FEMORAL COMPRESS: NORMAL
BH CV LOWER VASCULAR RIGHT MID FEMORAL PHASIC: NORMAL
BH CV LOWER VASCULAR RIGHT MID FEMORAL SPONT: NORMAL
BH CV LOWER VASCULAR RIGHT PERONEAL COMPRESS: NORMAL
BH CV LOWER VASCULAR RIGHT POPLITEAL AUGMENT: NORMAL
BH CV LOWER VASCULAR RIGHT POPLITEAL COMPETENT: NORMAL
BH CV LOWER VASCULAR RIGHT POPLITEAL COMPRESS: NORMAL
BH CV LOWER VASCULAR RIGHT POPLITEAL PHASIC: NORMAL
BH CV LOWER VASCULAR RIGHT POPLITEAL SPONT: NORMAL
BH CV LOWER VASCULAR RIGHT POSTERIOR TIBIAL COMPRESS: NORMAL
BH CV LOWER VASCULAR RIGHT PROXIMAL FEMORAL COMPRESS: NORMAL
BH CV LOWER VASCULAR RIGHT SAPHENOFEMORAL JUNCTION COMPRESS: NORMAL
BILIRUB SERPL-MCNC: <0.2 MG/DL (ref 0–1.2)
BUN SERPL-MCNC: 13 MG/DL (ref 6–20)
BUN/CREAT SERPL: 14.3 (ref 7–25)
CALCIUM SPEC-SCNC: 9.8 MG/DL (ref 8.6–10.5)
CHLORIDE SERPL-SCNC: 102 MMOL/L (ref 98–107)
CO2 SERPL-SCNC: 26.4 MMOL/L (ref 22–29)
CREAT SERPL-MCNC: 0.91 MG/DL (ref 0.76–1.27)
DEPRECATED RDW RBC AUTO: 42.5 FL (ref 37–54)
EGFRCR SERPLBLD CKD-EPI 2021: 98.3 ML/MIN/1.73
EOSINOPHIL # BLD AUTO: 0.24 10*3/MM3 (ref 0–0.4)
EOSINOPHIL NFR BLD AUTO: 3.3 % (ref 0.3–6.2)
ERYTHROCYTE [DISTWIDTH] IN BLOOD BY AUTOMATED COUNT: 13.5 % (ref 12.3–15.4)
GLOBULIN UR ELPH-MCNC: 3 GM/DL
GLUCOSE SERPL-MCNC: 106 MG/DL (ref 65–99)
HBA1C MFR BLD: 6.5 % (ref 4.8–5.6)
HCT VFR BLD AUTO: 40.4 % (ref 37.5–51)
HGB BLD-MCNC: 13.3 G/DL (ref 13–17.7)
IMM GRANULOCYTES # BLD AUTO: 0.03 10*3/MM3 (ref 0–0.05)
IMM GRANULOCYTES NFR BLD AUTO: 0.4 % (ref 0–0.5)
LYMPHOCYTES # BLD AUTO: 1.63 10*3/MM3 (ref 0.7–3.1)
LYMPHOCYTES NFR BLD AUTO: 22.3 % (ref 19.6–45.3)
MAXIMAL PREDICTED HEART RATE: 163 BPM
MCH RBC QN AUTO: 28.9 PG (ref 26.6–33)
MCHC RBC AUTO-ENTMCNC: 32.9 G/DL (ref 31.5–35.7)
MCV RBC AUTO: 87.6 FL (ref 79–97)
MONOCYTES # BLD AUTO: 0.66 10*3/MM3 (ref 0.1–0.9)
MONOCYTES NFR BLD AUTO: 9 % (ref 5–12)
NEUTROPHILS NFR BLD AUTO: 4.68 10*3/MM3 (ref 1.7–7)
NEUTROPHILS NFR BLD AUTO: 64.2 % (ref 42.7–76)
NRBC BLD AUTO-RTO: 0 /100 WBC (ref 0–0.2)
PLATELET # BLD AUTO: 373 10*3/MM3 (ref 140–450)
PMV BLD AUTO: 9.9 FL (ref 6–12)
POTASSIUM SERPL-SCNC: 4.1 MMOL/L (ref 3.5–5.2)
PROT SERPL-MCNC: 7.6 G/DL (ref 6–8.5)
RBC # BLD AUTO: 4.61 10*6/MM3 (ref 4.14–5.8)
SODIUM SERPL-SCNC: 139 MMOL/L (ref 136–145)
STRESS TARGET HR: 139 BPM
WBC NRBC COR # BLD: 7.3 10*3/MM3 (ref 3.4–10.8)

## 2022-03-09 PROCEDURE — 93970 EXTREMITY STUDY: CPT

## 2022-03-09 PROCEDURE — 93970 EXTREMITY STUDY: CPT | Performed by: SURGERY

## 2022-03-09 PROCEDURE — 80053 COMPREHEN METABOLIC PANEL: CPT

## 2022-03-09 PROCEDURE — 83036 HEMOGLOBIN GLYCOSYLATED A1C: CPT

## 2022-03-09 PROCEDURE — 82306 VITAMIN D 25 HYDROXY: CPT

## 2022-03-09 PROCEDURE — 36415 COLL VENOUS BLD VENIPUNCTURE: CPT

## 2022-03-09 PROCEDURE — 85025 COMPLETE CBC W/AUTO DIFF WBC: CPT

## 2022-03-10 LAB — 25(OH)D3 SERPL-MCNC: 15.4 NG/ML

## 2022-03-11 ENCOUNTER — OFFICE VISIT (OUTPATIENT)
Dept: ORTHOPEDIC SURGERY | Facility: CLINIC | Age: 57
End: 2022-03-11

## 2022-03-11 VITALS — WEIGHT: 238.5 LBS | OXYGEN SATURATION: 94 % | BODY MASS INDEX: 37.43 KG/M2 | HEART RATE: 92 BPM | HEIGHT: 67 IN

## 2022-03-11 DIAGNOSIS — M25.551 RIGHT HIP PAIN: ICD-10-CM

## 2022-03-11 DIAGNOSIS — M16.11 PRIMARY OSTEOARTHRITIS OF RIGHT HIP: ICD-10-CM

## 2022-03-11 DIAGNOSIS — M87.00 AVN (AVASCULAR NECROSIS OF BONE): Primary | ICD-10-CM

## 2022-03-11 PROCEDURE — 99214 OFFICE O/P EST MOD 30 MIN: CPT | Performed by: STUDENT IN AN ORGANIZED HEALTH CARE EDUCATION/TRAINING PROGRAM

## 2022-03-11 RX ORDER — MELOXICAM 7.5 MG/1
7.5 TABLET ORAL DAILY
Qty: 30 TABLET | Refills: 0 | Status: SHIPPED | OUTPATIENT
Start: 2022-03-11

## 2022-03-11 RX ORDER — CEPHALEXIN 500 MG/1
CAPSULE ORAL
COMMUNITY
Start: 2022-03-04 | End: 2022-08-05

## 2022-03-11 NOTE — PROGRESS NOTES
"Chief Complaint  Pain of the Right Hip    Subjective          Jose Rafael Ortiz presents to [facility] for   History of Present Illness    Jose Rafael returns today for follow-up of his right hip.  He has avascular necrosis with collapse and acetabular sided changes as well.  We had a long discussion previously about his overall health and medical comorbidities.  He is an active smoker with COPD.  He reports a poorly described cardiac history.  He has yet to see his primary care doctor or cardiologist.  He reports he previously saw a pulmonologist as well.  He has been unsuccessful with smoking cessation thus far.  He denies any new symptoms.  He continues to take ibuprofen for pain, but reports little relief.    Allergies   Allergen Reactions   • Benadryl [Diphenhydramine] Anaphylaxis   • Aspirin GI Intolerance        Social History     Socioeconomic History   • Marital status:    Tobacco Use   • Smoking status: Current Every Day Smoker     Packs/day: 0.25     Types: Cigarettes   • Smokeless tobacco: Never Used   Vaping Use   • Vaping Use: Never used   Substance and Sexual Activity   • Alcohol use: Not Currently   • Drug use: Never        I reviewed the patient's chief complaint, history of present illness, review of systems, past medical history, surgical history, family history, social history, medications, and allergy list.     REVIEW OF SYSTEMS    Constitutional: Denies fevers, chills, weight loss  Cardiovascular: Denies chest pain, shortness of breath  Skin: Denies rashes, acute skin changes  Neurologic: Denies headache, loss of consciousness  MSK: Right hip pain      Objective   Vital Signs:   Pulse 92   Ht 170.2 cm (67\")   Wt 108 kg (238 lb 8 oz)   SpO2 94%   BMI 37.35 kg/m²     Body mass index is 37.35 kg/m².    Physical Exam    General: Alert. No acute distress.   Right hip- antalgic gait. Limited hip ROM. Pain with hip ROM. Internal rotation neutral. External Rotation 20. " Full knee extension. No knee effusion or joint line pain. Intact extensor mechanism. Pre tibial edema. Calf soft. Positive EHL, FHL, GS and TA. Sensation intact to all 5 nerves of the foot. Weakly palpable posterior tibial pulse.  Pain and weakness with hip ROM. 4/5 hip Abduction.     Procedures    Imaging Results (Most Recent)     None                   Assessment and Plan    Diagnoses and all orders for this visit:    1. AVN (avascular necrosis of bone) (HCC) (Primary)  -     meloxicam (Mobic) 7.5 MG tablet; Take 1 tablet by mouth Daily.  Dispense: 30 tablet; Refill: 0  -     Ambulatory Referral to Pain Management    2. Right hip pain  -     meloxicam (Mobic) 7.5 MG tablet; Take 1 tablet by mouth Daily.  Dispense: 30 tablet; Refill: 0  -     Ambulatory Referral to Pain Management    3. Primary osteoarthritis of right hip  -     meloxicam (Mobic) 7.5 MG tablet; Take 1 tablet by mouth Daily.  Dispense: 30 tablet; Refill: 0  -     Ambulatory Referral to Pain Management        Jose Rafael's ultrasound study was negative for any venous abnormalities.  His vitamin D level is low and we discussed vitamin D supplementation today.  He will speak with his pharmacist.  We will add Mobic today to help with his pain.  Due to the severity of his pain he would like to speak with a pain management physician and we made this referral today.  We discussed the importance of smoking cessation and optimizing his overall medical state prior to any hip surgery.  He declined any formal assistance with smoking cessation today.  He is getting continue to try to quit on his own.  He was understanding overall.  He will follow-up with me in 3 weeks for reevaluation.  No x-rays needed when he returns.    Call or return if symptoms worsen or patient has any concerns.       Scribed for Edgar Dunham MD by Edgar Dunham MD  03/11/2022   10:39 EST         Follow Up   No follow-ups on file.  Patient was given instructions and counseling regarding his  condition or for health maintenance advice. Please see specific information pulled into the AVS if appropriate.       I have personally performed the services described in this document as scribed by the above individual and it is both accurate and complete.     Edgar Dunham MD  03/11/22  10:39 EST

## 2022-03-22 PROBLEM — J44.9 CHRONIC OBSTRUCTIVE PULMONARY DISEASE: Status: ACTIVE | Noted: 2022-03-22

## 2022-03-22 PROBLEM — F17.200 NICOTINE DEPENDENCE: Status: ACTIVE | Noted: 2022-03-22

## 2022-03-22 PROBLEM — R91.1 SOLITARY PULMONARY NODULE: Status: ACTIVE | Noted: 2022-03-22

## 2022-03-22 PROBLEM — K21.9 GASTROESOPHAGEAL REFLUX DISEASE: Status: ACTIVE | Noted: 2022-03-22

## 2022-03-22 PROBLEM — J45.909 ASTHMA: Status: ACTIVE | Noted: 2022-03-22

## 2022-03-22 PROBLEM — M19.90 OSTEOARTHRITIS: Status: ACTIVE | Noted: 2022-03-22

## 2022-03-22 PROBLEM — M10.9 GOUT: Status: ACTIVE | Noted: 2022-03-22

## 2022-03-22 PROBLEM — E78.5 HYPERLIPIDEMIA: Status: ACTIVE | Noted: 2022-03-22

## 2022-03-22 PROBLEM — M25.551 RIGHT HIP PAIN: Status: RESOLVED | Noted: 2022-02-14 | Resolved: 2022-03-22

## 2022-03-22 PROBLEM — I10 ESSENTIAL HYPERTENSION: Status: ACTIVE | Noted: 2022-03-22

## 2022-03-22 PROBLEM — F41.1 GENERALIZED ANXIETY DISORDER: Status: ACTIVE | Noted: 2022-03-22

## 2022-04-01 ENCOUNTER — HOSPITAL ENCOUNTER (EMERGENCY)
Facility: HOSPITAL | Age: 57
Discharge: LEFT BEFORE DISCHARGE | End: 2022-04-01

## 2022-04-01 PROCEDURE — 99211 OFF/OP EST MAY X REQ PHY/QHP: CPT

## 2022-04-14 ENCOUNTER — TRANSCRIBE ORDERS (OUTPATIENT)
Dept: DIABETES SERVICES | Facility: HOSPITAL | Age: 57
End: 2022-04-14

## 2022-04-14 DIAGNOSIS — E11.65 TYPE 2 DIABETES MELLITUS WITH HYPERGLYCEMIA, UNSPECIFIED WHETHER LONG TERM INSULIN USE: Primary | ICD-10-CM

## 2022-04-15 ENCOUNTER — TELEPHONE (OUTPATIENT)
Dept: ORTHOPEDIC SURGERY | Facility: CLINIC | Age: 57
End: 2022-04-15

## 2022-04-15 NOTE — TELEPHONE ENCOUNTER
PATIENT'S MOTHER CALLED VOICING CONCERNS REGARDING THE PATIENT. SHE STATES HE IS UNABLE TO STOP SMOKING, AND HIS MOBILITY AND ABILITY TO GET AROUND THE HOUSE IS GETTING WORSE. SHE WANTED TO TALK TO DR CHAPA ABOUT WHAT OPTIONS HE HAS IF HE DOES NOT STOP SMOKING; CAN HE STILL HAVE THE SURGERY OR NOT. SHE ALSO STATES THAT PATIENT WAS RECENTLY DIAGNOSED AS TYPE 2 DIABETIC. PUSHED THE IMPORTANCE OF THE PATIENT ATTEMPTING TO STOP SMOKING ESPECIALLY BEING DIABETIC AS WELL, AND ADVISED THAT I WOULD PASS HER MESSAGE TO DR. CHAPA.

## 2022-04-30 PROBLEM — I50.32 CHRONIC HEART FAILURE WITH PRESERVED EJECTION FRACTION: Status: ACTIVE | Noted: 2022-04-30

## 2022-08-05 ENCOUNTER — OFFICE VISIT (OUTPATIENT)
Dept: ORTHOPEDIC SURGERY | Facility: CLINIC | Age: 57
End: 2022-08-05

## 2022-08-05 VITALS — WEIGHT: 240 LBS | BODY MASS INDEX: 37.67 KG/M2 | HEIGHT: 67 IN

## 2022-08-05 DIAGNOSIS — M16.11 PRIMARY OSTEOARTHRITIS OF RIGHT HIP: ICD-10-CM

## 2022-08-05 DIAGNOSIS — M87.00 AVN (AVASCULAR NECROSIS OF BONE): Primary | ICD-10-CM

## 2022-08-05 PROCEDURE — 99213 OFFICE O/P EST LOW 20 MIN: CPT | Performed by: STUDENT IN AN ORGANIZED HEALTH CARE EDUCATION/TRAINING PROGRAM

## 2022-08-05 RX ORDER — IBUPROFEN 800 MG/1
TABLET ORAL
COMMUNITY
Start: 2022-05-11

## 2022-08-05 NOTE — PROGRESS NOTES
"Chief Complaint  Follow-up of the Right Hip    Subjective          Jose Rafael Ortiz presents to [facility] for   History of Present Illness     Jose Rafael returns for follow-up of his right hip.  He has avascular necrosis with collapse and acetabular sided arthritic changes as well.  We have been discussing his medical comorbidities.  He has been working on smoking cessation but has been unsuccessful on his own at this time.  He no longer drinks.  He also has a cardiac history but has not followed up with his cardiologist recently.  He denies any new symptoms.  He is using a cane.  He is using oral ibuprofen and topical medications.      Allergies   Allergen Reactions   • Benadryl [Diphenhydramine] Anaphylaxis   • Aspirin GI Intolerance        Social History     Socioeconomic History   • Marital status:    Tobacco Use   • Smoking status: Current Every Day Smoker     Packs/day: 0.25     Types: Cigarettes   • Smokeless tobacco: Never Used   Vaping Use   • Vaping Use: Never used   Substance and Sexual Activity   • Alcohol use: Not Currently   • Drug use: Never        I reviewed the patient's chief complaint, history of present illness, review of systems, past medical history, surgical history, family history, social history, medications, and allergy list.     REVIEW OF SYSTEMS    Constitutional: Denies fevers, chills, weight loss  Cardiovascular: Denies chest pain, shortness of breath  Skin: Denies rashes, acute skin changes  Neurologic: Denies headache, loss of consciousness  MSK: Right hip pain      Objective   Vital Signs:   Ht 170.2 cm (67\")   Wt 109 kg (240 lb)   BMI 37.59 kg/m²     Body mass index is 37.59 kg/m².    Physical Exam    General: Alert. No acute distress.   Right lower extremity: antalgic gait. Limited hip ROM. Pain with hip ROM. Internal rotation neutral. External Rotation 20. Full knee extension. No knee effusion or joint line pain. Intact extensor mechanism. Pre " tibial edema. Calf soft. Positive EHL, FHL, GS and TA. Sensation intact to all 5 nerves of the foot. Weakly palpable posterior tibial pulse.  Pain and weakness with hip ROM. 4/5 hip Abduction.     Procedures    Imaging Results (Most Recent)     None                   Assessment and Plan        No results found.     Diagnoses and all orders for this visit:    1. AVN (avascular necrosis of bone) (HCC) (Primary)    2. Primary osteoarthritis of right hip        Jose Rafael has right hip osteoarthritis secondary to avascular necrosis.  We again discussed right total hip arthroplasty today.  He is working on smoking cessation.  He is in and talk to his primary care physician about medication options to assist with his smoking cessation.  We discussed the importance of this as it relates to surgery.  He is also to follow-up with his cardiologist and possibly pulmonologist if deemed necessary by his PCP.  He will continue his vitamin D supplementation.  He is no longer drinking.  I will follow-up in 1 month for reevaluation.  We will obtain new x-rays of his right hip when he returns.  We may schedule surgery at that time depending on his progress.      Call or return if worsening symptoms.    Scribed for Edgar Dunham MD by Edgar Dunham MD  08/05/2022   11:31 EDT         Follow Up   Return in about 4 weeks (around 9/2/2022).  Patient was given instructions and counseling regarding his condition or for health maintenance advice. Please see specific information pulled into the AVS if appropriate.       I have personally performed the services described in this document as scribed by the above individual and it is both accurate and complete.     Edgar Dunham MD  08/05/22  11:31 EDT

## 2022-10-18 ENCOUNTER — TELEPHONE (OUTPATIENT)
Dept: ORTHOPEDIC SURGERY | Facility: CLINIC | Age: 57
End: 2022-10-18

## 2022-10-18 NOTE — TELEPHONE ENCOUNTER
Hub staff attempted to follow warm transfer process and was unsuccessful     Caller: BRANDIE SALGADO    Relationship to patient: Mother    Best call back number: [phone]    Patient is needing:  WANTS TO TALK TO DR CHAPA REGARDING SON. HE HAS NOT QUIT SMOKING AND SHE WANTED TO KNOW WHAT OTHER OPTIONS THEY HAD

## 2023-01-09 ENCOUNTER — TELEPHONE (OUTPATIENT)
Dept: ORTHOPEDIC SURGERY | Facility: CLINIC | Age: 58
End: 2023-01-09
Payer: MEDICARE

## 2023-01-09 NOTE — TELEPHONE ENCOUNTER
Caller: ELBA HINES    Relationship to patient: SELF    Best call back number: [phone]    Chief complaint: RIGHT HIP PAIN    Type of visit: INJECTION    Requested date: ASAP        Additional notes:[email or web address] [address] IF POSSIBLE FOR INJECTION APPT

## 2023-08-14 ENCOUNTER — TELEPHONE (OUTPATIENT)
Dept: ORTHOPEDIC SURGERY | Facility: CLINIC | Age: 58
End: 2023-08-14
Payer: MEDICARE

## 2023-08-14 NOTE — TELEPHONE ENCOUNTER
Caller: BRANDIE    Relationship: FROM PCP OFFICE OF [facility]    Best call back number: [phone]    Who is your current provider: [facility]    Who would you like your new provider to be: ANYONE ELSE    What are your reasons for transferring care: PT IS NOT SATISFIED WITH DR FERNANDES CARE    Additional notes: [facility] IS REQUESTING THIS ON BEHALF OF THE PT

## 2023-08-25 ENCOUNTER — PREP FOR SURGERY (OUTPATIENT)
Dept: OTHER | Facility: HOSPITAL | Age: 58
End: 2023-08-25
Payer: MEDICARE

## 2023-08-25 ENCOUNTER — TELEPHONE (OUTPATIENT)
Dept: ORTHOPEDIC SURGERY | Facility: CLINIC | Age: 58
End: 2023-08-25

## 2023-08-25 ENCOUNTER — OFFICE VISIT (OUTPATIENT)
Dept: ORTHOPEDIC SURGERY | Facility: CLINIC | Age: 58
End: 2023-08-25
Payer: MEDICARE

## 2023-08-25 VITALS — OXYGEN SATURATION: 94 % | BODY MASS INDEX: 35.16 KG/M2 | WEIGHT: 224 LBS | HEIGHT: 67 IN | HEART RATE: 67 BPM

## 2023-08-25 DIAGNOSIS — E66.9 OBESITY (BMI 30-39.9): ICD-10-CM

## 2023-08-25 DIAGNOSIS — M25.551 RIGHT HIP PAIN: Primary | ICD-10-CM

## 2023-08-25 DIAGNOSIS — M87.00 AVN (AVASCULAR NECROSIS OF BONE): ICD-10-CM

## 2023-08-25 DIAGNOSIS — M16.11 PRIMARY OSTEOARTHRITIS OF RIGHT HIP: ICD-10-CM

## 2023-08-25 DIAGNOSIS — M87.00 AVN (AVASCULAR NECROSIS OF BONE): Primary | ICD-10-CM

## 2023-08-25 RX ORDER — TRANEXAMIC ACID 10 MG/ML
1000 INJECTION, SOLUTION INTRAVENOUS ONCE
OUTPATIENT
Start: 2023-08-25 | End: 2023-08-25

## 2023-08-25 RX ORDER — CEFAZOLIN SODIUM 2 G/100ML
2 INJECTION, SOLUTION INTRAVENOUS ONCE
OUTPATIENT
Start: 2023-08-25 | End: 2023-08-25

## 2023-08-25 RX ORDER — CEFAZOLIN SODIUM IN 0.9 % NACL 3 G/100 ML
3 INTRAVENOUS SOLUTION, PIGGYBACK (ML) INTRAVENOUS ONCE
OUTPATIENT
Start: 2023-08-25 | End: 2023-08-25

## 2023-08-25 RX ORDER — TRAMADOL HYDROCHLORIDE 50 MG/1
50 TABLET ORAL EVERY 4 HOURS PRN
Qty: 30 TABLET | Refills: 0 | Status: SHIPPED | OUTPATIENT
Start: 2023-08-25

## 2023-08-25 RX ORDER — METHYLPREDNISOLONE 4 MG/1
TABLET ORAL
COMMUNITY
Start: 2023-08-14

## 2023-08-25 NOTE — PROGRESS NOTES
"Chief Complaint  Pain and Follow-up of the Right Hip    Subjective          Jose Rafael Ortiz presents to [facility] for   History of Present Illness    Jose Rafael returns for follow-up of his right hip.  He has right hip avascular necrosis we are treating nonoperatively.  He reports persistent pain that is constant and worse with weightbearing.  He is using a cane.  He is working on smoking cessation but has not been successful.  He denies any new trauma.    Allergies   Allergen Reactions    Benadryl [Diphenhydramine] Anaphylaxis    Aspirin GI Intolerance        Social History     Socioeconomic History    Marital status:    Tobacco Use    Smoking status: Every Day     Packs/day: 0.25     Types: Cigarettes    Smokeless tobacco: Never   Vaping Use    Vaping Use: Never used   Substance and Sexual Activity    Alcohol use: Not Currently    Drug use: Never        I reviewed the patient's chief complaint, history of present illness, review of systems, past medical history, surgical history, family history, social history, medications, and allergy list.     REVIEW OF SYSTEMS    Constitutional: Denies fevers, chills, weight loss  Cardiovascular: Denies chest pain, shortness of breath  Skin: Denies rashes, acute skin changes  Neurologic: Denies headache, loss of consciousness  MSK: Right hip pain      Objective   Vital Signs:   Pulse 67   Ht 170.2 cm (67\")   Wt 102 kg (224 lb)   SpO2 94%   BMI 35.08 kg/mý     Body mass index is 35.08 kg/mý.    Physical Exam    General: Alert. No acute distress.   Right lower extremity: 2 cm of shortening of the right hip compared to the left.  Hip flexion to 90 degrees with pain.  10 degree external rotation contracture.  4 out of 5 hip flexion and hip abduction.  2+ symmetric edema in the lower extremities at the ankle.  Sensation intact over the foot.  Intact active ankle plantarflexion and dorsiflexion.  Palpable pedal pulses.    Procedures    Imaging " Results (Most Recent)       Procedure Component Value Units Date/Time    XR Hip With or Without Pelvis 2 - 3 View Right [[id number]] Resulted: 08/25/23 1353     Updated: 08/25/23 1354    Narrative:      Indications: Follow-up right hip AVN    Views: AP and frog lateral right hip    Findings: Advanced avascular necrosis of the right hip with femoral head   collapse and reciprocal acetabular changes.  Hip reduced.    Comparative Data: Comparative data found and reviewed today advanced AVN   with femoral collapse                     Assessment and Plan        XR Hip With or Without Pelvis 2 - 3 View Right    Result Date: 8/25/2023  Narrative: Indications: Follow-up right hip AVN Views: AP and frog lateral right hip Findings: Advanced avascular necrosis of the right hip with femoral head collapse and reciprocal acetabular changes.  Hip reduced. Comparative Data: Comparative data found and reviewed today advanced AVN with femoral collapse      Diagnoses and all orders for this visit:    1. Right hip pain (Primary)  -     XR Hip With or Without Pelvis 2 - 3 View Right  -     traMADol (ULTRAM) 50 MG tablet; Take 1 tablet by mouth Every 4 (Four) Hours As Needed for Moderate Pain.  Dispense: 30 tablet; Refill: 0    2. AVN (avascular necrosis of bone)    3. Primary osteoarthritis of right hip    4. Obesity (BMI 30-39.9)        Reviewed his x-rays.  His avascular necrosis is advancing in the leg shortening.  We discussed treatment options.  We discussed both operative and nonoperative management.  We discussed the risks, benefits, indications, and alternatives to a right total hip arthroplasty.  We discussed the anterior approach.  We discussed surgery risk including bleeding, infection, damage to nerves and blood vessels, hardware complications, fracture, loosening, instability, persistent pain, functional limitation, anesthesia risk including mortality, DVT/PE, and need for additional procedures.  He elected to proceed with  surgery.    Will obtain X-Rays of right hip at next visit.     Educated on risk of elevated BMI.  Discussed options for weight loss/decreasing BMI prior to procedure including dietician consult, weight loss options and exercise program., Educated on risk of smoking. Discussed options for smoking cessation., Discussed surgery., Risks/benefits discussed with patient including, but not limited to: infection, bleeding, neurovascular damage, malunion, nonunion, aesthetic deformity, need for further surgery, and death., Discussed with patient the implant type being used during surgery and patient understands., Surgery pamphlet given., Call or return if worsening symptoms., and DME order for a 3 in 1 given today due to patient will be confined to one room/level of the home that does not offer a toilet during postop recovery.     Scribed for Edgar Dunham MD by Edgar Dunham MD  08/25/2023   14:04 EDT         Follow Up       2-week postop    Patient was given instructions and counseling regarding his condition or for health maintenance advice. Please see specific information pulled into the AVS if appropriate.       I have personally performed the services described in this document as scribed by the above individual and it is both accurate and complete.     Edgar Dunham MD  08/25/23  14:06 EDT

## 2023-08-25 NOTE — TELEPHONE ENCOUNTER
SPOKE WITH PATIENT, SURGERY SCHEDULED FOR 9/28/2023. I AM MAILING OUT THE SURGERY INSTRUCTIONS TO THE PATIENT.

## 2023-09-14 ENCOUNTER — TELEPHONE (OUTPATIENT)
Dept: ORTHOPEDIC SURGERY | Facility: CLINIC | Age: 58
End: 2023-09-14
Payer: MEDICARE

## 2023-09-14 NOTE — TELEPHONE ENCOUNTER
MADE SEVERAL ATTEMPTS TO COMPLETE TOTAL JOINT INTERVIEW OVER THE PHONE. PATIENT'S VOICEMAIL BOX IS FULL SO I AM UNABLE TO LEAVE A MESSAGE REQUESTING RETURN CALL.

## 2023-09-18 ENCOUNTER — TELEPHONE (OUTPATIENT)
Dept: ORTHOPEDIC SURGERY | Facility: CLINIC | Age: 58
End: 2023-09-18
Payer: MEDICARE

## 2023-09-18 NOTE — TELEPHONE ENCOUNTER
PER LUIS CAMERON IN PAT - PATIENT NO SHOWED FOR PAT AND RCVD A CALL FROM HIS MOTHER BRANDIE WHO STATES THAT THE PATIENT IS SCARED TO HAVE SURGERY. SURGERY FOR 9/28/2023 WAS CX'D DUE TO NOT SHOWING UP FOR HIS PAT APPOINTMENT. SPOKE WITH MOTHER BRANDIE AND SHE STATES THAT MR. HINES IS SCARED TO UNDERGO SURGERY AT THIS TIME AND WILL CALL BACK AT A LATER TIME TO SCHEDULE A F/U WITH DR. CHAPA TO DISCUSS SURGERY.

## 2023-11-14 ENCOUNTER — TELEPHONE (OUTPATIENT)
Dept: ORTHOPEDIC SURGERY | Facility: CLINIC | Age: 58
End: 2023-11-14
Payer: MEDICARE

## 2023-11-14 NOTE — TELEPHONE ENCOUNTER
SPOKE WITH PATIENT, PATIENT IS SCHEDULED TO FOLLOW-UP WITH DR. CHAPA ON 11/20/2023 TO DISCUSS SURGICAL OPTIONS

## 2023-11-14 NOTE — TELEPHONE ENCOUNTER
Caller: BRANDIE    Relationship to patient: [facility]    Best call back number:     Chief complaint: RIGHT HIP    Type of visit: right TOTAL HIP ARTHROPLASTY ANTERIOR     Requested date: ASAP    If rescheduling, when is the original appointment: 9/28/23

## 2023-11-20 ENCOUNTER — PREP FOR SURGERY (OUTPATIENT)
Dept: OTHER | Facility: HOSPITAL | Age: 58
End: 2023-11-20
Payer: MEDICARE

## 2023-11-20 ENCOUNTER — OFFICE VISIT (OUTPATIENT)
Dept: ORTHOPEDIC SURGERY | Facility: CLINIC | Age: 58
End: 2023-11-20
Payer: MEDICARE

## 2023-11-20 VITALS — HEIGHT: 67 IN | WEIGHT: 224 LBS | BODY MASS INDEX: 35.16 KG/M2 | OXYGEN SATURATION: 94 % | HEART RATE: 88 BPM

## 2023-11-20 DIAGNOSIS — M87.00 AVN (AVASCULAR NECROSIS OF BONE): ICD-10-CM

## 2023-11-20 DIAGNOSIS — E66.9 OBESITY (BMI 30-39.9): ICD-10-CM

## 2023-11-20 DIAGNOSIS — M25.551 RIGHT HIP PAIN: Primary | ICD-10-CM

## 2023-11-20 DIAGNOSIS — M16.11 PRIMARY OSTEOARTHRITIS OF RIGHT HIP: ICD-10-CM

## 2023-11-20 DIAGNOSIS — M87.00 AVN (AVASCULAR NECROSIS OF BONE): Primary | ICD-10-CM

## 2023-11-20 PROCEDURE — 99213 OFFICE O/P EST LOW 20 MIN: CPT | Performed by: STUDENT IN AN ORGANIZED HEALTH CARE EDUCATION/TRAINING PROGRAM

## 2023-11-20 PROCEDURE — 1159F MED LIST DOCD IN RCRD: CPT | Performed by: STUDENT IN AN ORGANIZED HEALTH CARE EDUCATION/TRAINING PROGRAM

## 2023-11-20 PROCEDURE — 1160F RVW MEDS BY RX/DR IN RCRD: CPT | Performed by: STUDENT IN AN ORGANIZED HEALTH CARE EDUCATION/TRAINING PROGRAM

## 2023-11-20 RX ORDER — CEFAZOLIN SODIUM 2 G/100ML
2 INJECTION, SOLUTION INTRAVENOUS ONCE
OUTPATIENT
Start: 2023-11-20 | End: 2023-11-20

## 2023-11-20 RX ORDER — CEFAZOLIN SODIUM IN 0.9 % NACL 3 G/100 ML
3 INTRAVENOUS SOLUTION, PIGGYBACK (ML) INTRAVENOUS ONCE
OUTPATIENT
Start: 2023-11-20 | End: 2023-11-20

## 2023-11-20 RX ORDER — TRANEXAMIC ACID 10 MG/ML
1000 INJECTION, SOLUTION INTRAVENOUS ONCE
OUTPATIENT
Start: 2023-11-20 | End: 2023-11-20

## 2023-11-20 RX ORDER — ROSUVASTATIN CALCIUM 20 MG/1
TABLET, FILM COATED ORAL EVERY 24 HOURS
COMMUNITY
Start: 2023-11-14

## 2023-11-20 NOTE — PROGRESS NOTES
"Chief Complaint  Follow-up and Pain of the Right Hip    Subjective          Jose Rafael Ortiz presents to [facility] for   History of Present Illness    Jose Rafael returns for follow-up of his right hip.  He has right hip avascular necrosis we are treating nonoperatively. He is ambulating with a crutch. He continues to have pain. He reports the pain is increasing. He is working on smoking cessation.  He was previously scheduled for surgery but canceled.    Allergies   Allergen Reactions    Benadryl [Diphenhydramine] Anaphylaxis    Aspirin GI Intolerance        Social History     Socioeconomic History    Marital status:    Tobacco Use    Smoking status: Every Day     Packs/day: .25     Types: Cigarettes    Smokeless tobacco: Never   Vaping Use    Vaping Use: Never used   Substance and Sexual Activity    Alcohol use: Not Currently    Drug use: Never        I reviewed the patient's chief complaint, history of present illness, review of systems, past medical history, surgical history, family history, social history, medications, and allergy list.     REVIEW OF SYSTEMS    Constitutional: Denies fevers, chills, weight loss  Cardiovascular: Denies chest pain, shortness of breath  Skin: Denies rashes, acute skin changes  Neurologic: Denies headache, loss of consciousness  MSK: Right hip pain      Objective   Vital Signs:   Pulse 88   Ht 170.2 cm (67\")   Wt 102 kg (224 lb)   SpO2 94%   BMI 35.08 kg/m²     Body mass index is 35.08 kg/m².    Physical Exam    General: Alert. No acute distress.   Right hip- 2 cm of shortening of the right hip compared to the left.  Hip flexion to 90 degrees with pain.  10 degree external rotation contracture.  4 out of 5 hip flexion and hip abduction.  2+ symmetric edema in the lower extremities at the ankle.  Sensation intact over the foot.  Intact active ankle plantarflexion and dorsiflexion.  Palpable pedal pulses.     Procedures    Imaging Results (Most " Recent)       Procedure Component Value Units Date/Time    XR Hip With or Without Pelvis 2 - 3 View Right [[id number]] Resulted: 11/20/23 1438     Updated: 11/20/23 1439    Narrative:      Indications: Follow-up right hip avascular necrosis    Views: AP pelvis, AP and frog-lateral right hip    Findings: Progressive avascular necrosis changes in the femoral head with   complete collapse.  Reciprocal changes on the acetabulum including   subchondral cyst formation.  No obvious acetabular bone loss.  Hip remains   reduced.  Degenerative changes noted in the lumbar spine.    Comparative Data: Comparative data found and reviewed today                     Assessment and Plan        XR Hip With or Without Pelvis 2 - 3 View Right    Result Date: 11/20/2023  Narrative: Indications: Follow-up right hip avascular necrosis Views: AP pelvis, AP and frog-lateral right hip Findings: Progressive avascular necrosis changes in the femoral head with complete collapse.  Reciprocal changes on the acetabulum including subchondral cyst formation.  No obvious acetabular bone loss.  Hip remains reduced.  Degenerative changes noted in the lumbar spine. Comparative Data: Comparative data found and reviewed today      Diagnoses and all orders for this visit:    1. Right hip pain (Primary)  -     XR Hip With or Without Pelvis 2 - 3 View Right    2. AVN (avascular necrosis of bone)    3. Primary osteoarthritis of right hip    4. Obesity (BMI 30-39.9)        Discussed the treatment options with the patient, operative vs non-operative. I reviewed the x-rays that were obtained today with the patient. Discussed the risks and benefits of a Right Total Hip Arthroplasty.  We discussed bleeding, infection, damage nerves and blood vessels, hardware complications, leg length discrepancy, fracture, loosening, instability, persistent pain, functional limitation, anesthesia risk include mortality, DVT/PE, and need for additional procedures.  The patient  expressed understanding and wished to proceed.       Will obtain X-Rays of Right hip at next visit.     Educated on risk of smoking/nicotine. Discussed options for smoking cessation., Discussed surgery., Risks/benefits discussed with patient including, but not limited to: infection, bleeding, neurovascular damage, malunion, nonunion, aesthetic deformity, need for further surgery, and death., Discussed with patient the implant type being used during surgery and patient understands., Surgery pamphlet given., Call or return if worsening symptoms., and DME order for a 3 in 1 given today due to patient will be confined to one room/level of the home that does not offer a toilet during postop recovery.     Scribed for Edgar uDnham MD by Amanda Chavez  11/20/2023   14:00 EST         Follow Up       2 weeks postoperatively.      Patient was given instructions and counseling regarding his condition or for health maintenance advice. Please see specific information pulled into the AVS if appropriate.       I have personally performed the services described in this document as scribed by the above individual and it is both accurate and complete.     Edgar Dunham MD  11/20/23  14:41 EST

## 2023-11-29 DIAGNOSIS — Z47.1 AFTERCARE FOLLOWING RIGHT HIP JOINT REPLACEMENT SURGERY: Primary | ICD-10-CM

## 2023-11-29 DIAGNOSIS — Z96.641 AFTERCARE FOLLOWING RIGHT HIP JOINT REPLACEMENT SURGERY: Primary | ICD-10-CM

## 2023-12-05 ENCOUNTER — HOSPITAL ENCOUNTER (OUTPATIENT)
Dept: GENERAL RADIOLOGY | Facility: HOSPITAL | Age: 58
Discharge: HOME | End: 2023-12-05
Payer: MEDICARE

## 2023-12-05 ENCOUNTER — PRE-ADMISSION TESTING (OUTPATIENT)
Dept: PREADMISSION TESTING | Facility: HOSPITAL | Age: 58
End: 2023-12-05
Payer: MEDICARE

## 2023-12-05 ENCOUNTER — TELEPHONE (OUTPATIENT)
Dept: ORTHOPEDIC SURGERY | Facility: CLINIC | Age: 58
End: 2023-12-05
Payer: MEDICARE

## 2023-12-05 VITALS
RESPIRATION RATE: 16 BRPM | TEMPERATURE: 98.4 F | OXYGEN SATURATION: 97 % | HEART RATE: 92 BPM | DIASTOLIC BLOOD PRESSURE: 72 MMHG | BODY MASS INDEX: 35.67 KG/M2 | WEIGHT: 227.29 LBS | HEIGHT: 67 IN | SYSTOLIC BLOOD PRESSURE: 138 MMHG

## 2023-12-05 DIAGNOSIS — R06.2 BILATERAL WHEEZING: ICD-10-CM

## 2023-12-05 DIAGNOSIS — R06.2 BILATERAL WHEEZING: Primary | ICD-10-CM

## 2023-12-05 DIAGNOSIS — M87.00 AVN (AVASCULAR NECROSIS OF BONE): ICD-10-CM

## 2023-12-05 DIAGNOSIS — M16.11 PRIMARY OSTEOARTHRITIS OF RIGHT HIP: ICD-10-CM

## 2023-12-05 LAB
ALBUMIN SERPL-MCNC: 4.3 G/DL (ref 3.5–5.2)
ALBUMIN/GLOB SERPL: 1.7 G/DL
ALP SERPL-CCNC: 99 U/L (ref 39–117)
ALT SERPL W P-5'-P-CCNC: 16 U/L (ref 1–41)
ANION GAP SERPL CALCULATED.3IONS-SCNC: 9 MMOL/L (ref 5–15)
AST SERPL-CCNC: 16 U/L (ref 1–40)
BASOPHILS # BLD AUTO: 0.05 10*3/MM3 (ref 0–0.2)
BASOPHILS NFR BLD AUTO: 0.7 % (ref 0–1.5)
BILIRUB SERPL-MCNC: 0.2 MG/DL (ref 0–1.2)
BILIRUB UR QL STRIP: NEGATIVE
BUN SERPL-MCNC: 21 MG/DL (ref 6–20)
BUN/CREAT SERPL: 21.2 (ref 7–25)
CALCIUM SPEC-SCNC: 9.3 MG/DL (ref 8.6–10.5)
CHLORIDE SERPL-SCNC: 102 MMOL/L (ref 98–107)
CLARITY UR: CLEAR
CO2 SERPL-SCNC: 28 MMOL/L (ref 22–29)
COLOR UR: YELLOW
CREAT SERPL-MCNC: 0.99 MG/DL (ref 0.76–1.27)
DEPRECATED RDW RBC AUTO: 42.9 FL (ref 37–54)
EGFRCR SERPLBLD CKD-EPI 2021: 87.8 ML/MIN/1.73
EOSINOPHIL # BLD AUTO: 0.25 10*3/MM3 (ref 0–0.4)
EOSINOPHIL NFR BLD AUTO: 3.5 % (ref 0.3–6.2)
ERYTHROCYTE [DISTWIDTH] IN BLOOD BY AUTOMATED COUNT: 13.2 % (ref 12.3–15.4)
GLOBULIN UR ELPH-MCNC: 2.5 GM/DL
GLUCOSE SERPL-MCNC: 159 MG/DL (ref 65–99)
GLUCOSE UR STRIP-MCNC: ABNORMAL MG/DL
HBA1C MFR BLD: 6.2 % (ref 4.8–5.6)
HCT VFR BLD AUTO: 38.2 % (ref 37.5–51)
HGB BLD-MCNC: 12.6 G/DL (ref 13–17.7)
HGB UR QL STRIP.AUTO: NEGATIVE
IMM GRANULOCYTES # BLD AUTO: 0.01 10*3/MM3 (ref 0–0.05)
IMM GRANULOCYTES NFR BLD AUTO: 0.1 % (ref 0–0.5)
INR PPP: 0.95 (ref 0.86–1.15)
KETONES UR QL STRIP: NEGATIVE
LEUKOCYTE ESTERASE UR QL STRIP.AUTO: NEGATIVE
LYMPHOCYTES # BLD AUTO: 1.53 10*3/MM3 (ref 0.7–3.1)
LYMPHOCYTES NFR BLD AUTO: 21.5 % (ref 19.6–45.3)
MCH RBC QN AUTO: 29.6 PG (ref 26.6–33)
MCHC RBC AUTO-ENTMCNC: 33 G/DL (ref 31.5–35.7)
MCV RBC AUTO: 89.7 FL (ref 79–97)
MONOCYTES # BLD AUTO: 0.37 10*3/MM3 (ref 0.1–0.9)
MONOCYTES NFR BLD AUTO: 5.2 % (ref 5–12)
NEUTROPHILS NFR BLD AUTO: 4.92 10*3/MM3 (ref 1.7–7)
NEUTROPHILS NFR BLD AUTO: 69 % (ref 42.7–76)
NITRITE UR QL STRIP: NEGATIVE
NRBC BLD AUTO-RTO: 0 /100 WBC (ref 0–0.2)
PH UR STRIP.AUTO: 5.5 [PH] (ref 5–8)
PLATELET # BLD AUTO: 261 10*3/MM3 (ref 140–450)
PMV BLD AUTO: 9.4 FL (ref 6–12)
POTASSIUM SERPL-SCNC: 4.3 MMOL/L (ref 3.5–5.2)
PROT SERPL-MCNC: 6.8 G/DL (ref 6–8.5)
PROT UR QL STRIP: NEGATIVE
PROTHROMBIN TIME: 12.9 SECONDS (ref 11.8–14.9)
QT INTERVAL: 343 MS
QTC INTERVAL: 415 MS
RBC # BLD AUTO: 4.26 10*6/MM3 (ref 4.14–5.8)
SODIUM SERPL-SCNC: 139 MMOL/L (ref 136–145)
SP GR UR STRIP: 1.02 (ref 1–1.03)
UROBILINOGEN UR QL STRIP: ABNORMAL
WBC NRBC COR # BLD AUTO: 7.13 10*3/MM3 (ref 3.4–10.8)

## 2023-12-05 PROCEDURE — 85025 COMPLETE CBC W/AUTO DIFF WBC: CPT

## 2023-12-05 PROCEDURE — 83036 HEMOGLOBIN GLYCOSYLATED A1C: CPT

## 2023-12-05 PROCEDURE — 80053 COMPREHEN METABOLIC PANEL: CPT

## 2023-12-05 PROCEDURE — 71046 X-RAY EXAM CHEST 2 VIEWS: CPT

## 2023-12-05 PROCEDURE — 36415 COLL VENOUS BLD VENIPUNCTURE: CPT

## 2023-12-05 PROCEDURE — 85610 PROTHROMBIN TIME: CPT

## 2023-12-05 PROCEDURE — 93005 ELECTROCARDIOGRAM TRACING: CPT

## 2023-12-05 PROCEDURE — 81003 URINALYSIS AUTO W/O SCOPE: CPT

## 2023-12-05 RX ORDER — AZITHROMYCIN 250 MG
250 CAPSULE ORAL EVERY 24 HOURS
COMMUNITY
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
## 2023-12-05 NOTE — TELEPHONE ENCOUNTER
ATTEMPTED TO CALL PATIENT TO DISCUSS ABNORMAL LAB WORK AT [facility], AS WELL AS ONGOING URI AND RECENT ANTIBIOTIC TREATMENT. LEFT MESSAGE REQUESTING A CALLBACK TO MYSELF OR KAREN.

## 2023-12-05 NOTE — DISCHARGE INSTRUCTIONS
IMPORTANT INSTRUCTIONS - PRE-ADMISSION TESTING  DO NOT EAT OR CHEW anything after midnight the night before your procedure.    You may have CLEAR liquids up to __3____ hours prior to ARRIVAL time. INCLUDES GATORADE 20 OZ SUGAR FREE NO RED  Take the following medications the morning of your procedure with JUST A SIP OF WATER:  _ATENOLOL, CETIRIZINE, CRESTOR, OMEPRAZOLE, INHALERS BREO AND ELLIPTA ______________________________________________________________________________________________________________________________________________________________________________________    DO NOT BRING your medications to the hospital with you, UNLESS something has changed since your PRE-Admission Testing appointment.  Hold all vitamins, supplements, and NSAIDS (Non- steroidal anti-inflammatory meds) for one week prior to surgery (you MAY take Tylenol or Acetaminophen).  If you are diabetic, check your blood sugar the morning of your procedure. If it is less than 70 or if you are feeling symptomatic, call the following number for further instructions: [phone] SAME DAY SURGERY WILL CALL ARRIVAL TIME 12/13/23 BEFORE 4 P.M.____.  Use your inhalers/nebulizers as usual, the morning of your procedure. BRING YOUR INHALERS with you.   Bring your CPAP or BIPAP to hospital, ONLY IF YOU WILL BE SPENDING THE NIGHT. NA  Make sure you have a ride home and have someone who will stay with you the day of your procedure after you go home.  If you have any questions, please call your Pre-Admission Testing Nurse, ________SARAH_______ at [phone]__.   Per anesthesia request, do not smoke for 24 hours before your procedure or as instructed by your surgeon.  !!!!!!  SHOWER AS DIRECTED ON PAGE 9 OF TOTAL JOINT BOOK   BRING TOTAL JOINT BOOK BACK WITH YOU THE DAY OF SURGERY

## 2023-12-07 NOTE — TELEPHONE ENCOUNTER
SPOKE WITH PATIENT, LET HIM KNOW THAT WE WILL BE SEEKING PULMONARY CLEARANCE PRIOR TO SURGERY, DUE TO WHEEZING AND NEW PATIENT APPT ON 01/16/24. LET HIM KNOW THAT SURGERY IS CANCELLED FOR NEXT WEEK AND WE WILL HANDLE GETTING NECESSARY CLEARANCE. PATIENT ALSO COUNSELED ON WATCHING HIS SUGAR INTAKE DUE TO GLUCOSE IN URINE. HE VOICED UNDERSTANDING TO ALL INFORMATION.

## 2023-12-27 ENCOUNTER — TELEPHONE (OUTPATIENT)
Dept: ORTHOPEDIC SURGERY | Facility: CLINIC | Age: 58
End: 2023-12-27
Payer: MEDICARE

## 2023-12-27 NOTE — TELEPHONE ENCOUNTER
PATIENT'S MOTHER CALLED TO REPORT THAT PATIENT'S PAIN HAS GOTTEN WORSE, SHE STATES HE IS MISERABLE AND SHE WOULD LIKE TO KNOW IF WE CAN HAVE PATIENT ADMITTED TO THE HOSPITAL. SHE STATES HE IS HAVING OUTBURSTS OF ANGER AND SHE CANNOT CALM HIM DOWN WHEN HE DOES THIS.    I LET HER KNOW THAT WE CANNOT HAVE PATIENT ADMITTED, AND WE CANNOT DO SURGERY UNTIL HE IS CLEARED BY PULMONOLOGIST AND OTHER MEDICAL ISSUES ARE ADDRESSED. I ADVISED HER TO REACH OUT TO DR. HUDSON'S OFFICE TO SEE IF THEY CAN SEE PATIENT OR HELP TO ADDRESS THE CONCERNS FOR HIS MENTAL STATE. SHE VOICED UNDERSTANDING AND WILL CALL PCP.

## 2024-02-27 ENCOUNTER — TRANSCRIBE ORDERS (OUTPATIENT)
Dept: SLEEP MEDICINE | Facility: HOSPITAL | Age: 59
End: 2024-02-27
Payer: MEDICARE

## 2024-02-27 DIAGNOSIS — G47.33 OBSTRUCTIVE SLEEP APNEA (ADULT) (PEDIATRIC): Primary | ICD-10-CM

## 2024-04-23 ENCOUNTER — TELEPHONE (OUTPATIENT)
Dept: ORTHOPEDIC SURGERY | Facility: CLINIC | Age: 59
End: 2024-04-23
Payer: MEDICARE

## 2024-04-23 NOTE — TELEPHONE ENCOUNTER
RECEIVED A CALL FROM MOTHER, BRANDIE IN THE OFFICE ON PHONE AS A MESSAGE.  I CALLED AND SPOKE TO MOTHER AND INFORMED HER THAT WE COULD NOT DO THE HIP REPLACEMENT SURGERY UNTIL MEDICAL ISSUES WERE RESOLVED.  MOTHER STATES THAT HE NEEDS TO BE  ADMITTED TO HOSPITAL FOR MEDICAL ATTENTION AND HIP SURGERY.  I INFORMED HER THAT DR CHAPA COULD NOT ADMIT TO THE HOSPITAL.  PATIENT NEEDS TO BE SEEN BY HIS PCP. PATIENT HAS NOT KEPT APPOINTMENTS FOR CLEARANCE AND MEDICAL TREATMENT.   MOTHER STATES HE IS THREATENING HER AND IS BEING VERBALLY ABUSIVE.  I INFORMED AND STRONGLY URGED HER TO CALL THE POLICE.

## 2024-05-02 ENCOUNTER — TRANSCRIBE ORDERS (OUTPATIENT)
Dept: ADMINISTRATIVE | Facility: HOSPITAL | Age: 59
End: 2024-05-02
Payer: MEDICARE

## 2024-05-02 ENCOUNTER — HOSPITAL ENCOUNTER (OUTPATIENT)
Dept: GENERAL RADIOLOGY | Facility: HOSPITAL | Age: 59
Discharge: HOME | End: 2024-05-02
Payer: MEDICARE

## 2024-05-02 ENCOUNTER — LAB (OUTPATIENT)
Dept: LAB | Facility: HOSPITAL | Age: 59
End: 2024-05-02
Payer: MEDICARE

## 2024-05-02 DIAGNOSIS — J44.1 COPD EXACERBATION: ICD-10-CM

## 2024-05-02 DIAGNOSIS — J44.1 COPD EXACERBATION: Primary | ICD-10-CM

## 2024-05-02 LAB
ANION GAP SERPL CALCULATED.3IONS-SCNC: 12.3 MMOL/L (ref 5–15)
BASOPHILS # BLD AUTO: 0.07 10*3/MM3 (ref 0–0.2)
BASOPHILS NFR BLD AUTO: 1 % (ref 0–1.5)
BUN SERPL-MCNC: 28 MG/DL (ref 6–20)
BUN/CREAT SERPL: 27.5 (ref 7–25)
CALCIUM SPEC-SCNC: 9.6 MG/DL (ref 8.6–10.5)
CHLORIDE SERPL-SCNC: 104 MMOL/L (ref 98–107)
CO2 SERPL-SCNC: 25.7 MMOL/L (ref 22–29)
CREAT SERPL-MCNC: 1.02 MG/DL (ref 0.76–1.27)
DEPRECATED RDW RBC AUTO: 44.2 FL (ref 37–54)
EGFRCR SERPLBLD CKD-EPI 2021: 84.7 ML/MIN/1.73
EOSINOPHIL # BLD AUTO: 0.25 10*3/MM3 (ref 0–0.4)
EOSINOPHIL NFR BLD AUTO: 3.7 % (ref 0.3–6.2)
ERYTHROCYTE [DISTWIDTH] IN BLOOD BY AUTOMATED COUNT: 13.8 % (ref 12.3–15.4)
GLUCOSE SERPL-MCNC: 94 MG/DL (ref 65–99)
HCT VFR BLD AUTO: 38 % (ref 37.5–51)
HGB BLD-MCNC: 12.8 G/DL (ref 13–17.7)
IMM GRANULOCYTES # BLD AUTO: 0.01 10*3/MM3 (ref 0–0.05)
IMM GRANULOCYTES NFR BLD AUTO: 0.1 % (ref 0–0.5)
LYMPHOCYTES # BLD AUTO: 1.81 10*3/MM3 (ref 0.7–3.1)
LYMPHOCYTES NFR BLD AUTO: 26.7 % (ref 19.6–45.3)
MCH RBC QN AUTO: 29.6 PG (ref 26.6–33)
MCHC RBC AUTO-ENTMCNC: 33.7 G/DL (ref 31.5–35.7)
MCV RBC AUTO: 88 FL (ref 79–97)
MONOCYTES # BLD AUTO: 0.73 10*3/MM3 (ref 0.1–0.9)
MONOCYTES NFR BLD AUTO: 10.8 % (ref 5–12)
NEUTROPHILS NFR BLD AUTO: 3.91 10*3/MM3 (ref 1.7–7)
NEUTROPHILS NFR BLD AUTO: 57.7 % (ref 42.7–76)
NRBC BLD AUTO-RTO: 0 /100 WBC (ref 0–0.2)
PLATELET # BLD AUTO: 270 10*3/MM3 (ref 140–450)
PMV BLD AUTO: 10 FL (ref 6–12)
POTASSIUM SERPL-SCNC: 4.6 MMOL/L (ref 3.5–5.2)
RBC # BLD AUTO: 4.32 10*6/MM3 (ref 4.14–5.8)
SODIUM SERPL-SCNC: 142 MMOL/L (ref 136–145)
T4 FREE SERPL-MCNC: 0.93 NG/DL (ref 0.93–1.7)
TSH SERPL DL<=0.05 MIU/L-ACNC: 1.81 UIU/ML (ref 0.27–4.2)
WBC NRBC COR # BLD AUTO: 6.78 10*3/MM3 (ref 3.4–10.8)

## 2024-05-02 PROCEDURE — 36415 COLL VENOUS BLD VENIPUNCTURE: CPT

## 2024-05-02 PROCEDURE — 84439 ASSAY OF FREE THYROXINE: CPT

## 2024-05-02 PROCEDURE — 84443 ASSAY THYROID STIM HORMONE: CPT

## 2024-05-02 PROCEDURE — 85025 COMPLETE CBC W/AUTO DIFF WBC: CPT

## 2024-05-02 PROCEDURE — 80048 BASIC METABOLIC PNL TOTAL CA: CPT

## 2024-05-02 PROCEDURE — 71046 X-RAY EXAM CHEST 2 VIEWS: CPT

## 2024-05-09 ENCOUNTER — OFFICE VISIT (OUTPATIENT)
Dept: PULMONOLOGY | Facility: CLINIC | Age: 59
End: 2024-05-09
Payer: MEDICARE

## 2024-05-09 VITALS
DIASTOLIC BLOOD PRESSURE: 60 MMHG | HEART RATE: 84 BPM | BODY MASS INDEX: 35.08 KG/M2 | RESPIRATION RATE: 18 BRPM | OXYGEN SATURATION: 98 % | TEMPERATURE: 97.5 F | SYSTOLIC BLOOD PRESSURE: 95 MMHG | HEIGHT: 67 IN | WEIGHT: 223.5 LBS

## 2024-05-09 DIAGNOSIS — Z72.0 TOBACCO ABUSE: ICD-10-CM

## 2024-05-09 DIAGNOSIS — Z71.6 TOBACCO ABUSE COUNSELING: ICD-10-CM

## 2024-05-09 DIAGNOSIS — J41.1 MUCOPURULENT CHRONIC BRONCHITIS: Primary | ICD-10-CM

## 2024-05-09 DIAGNOSIS — Z87.891 PERSONAL HISTORY OF TOBACCO USE, PRESENTING HAZARDS TO HEALTH: ICD-10-CM

## 2024-05-09 DIAGNOSIS — R06.09 DOE (DYSPNEA ON EXERTION): ICD-10-CM

## 2024-05-09 DIAGNOSIS — R05.3 CHRONIC COUGH: ICD-10-CM

## 2024-05-09 DIAGNOSIS — F17.210 NICOTINE DEPENDENCE, CIGARETTES, UNCOMPLICATED: ICD-10-CM

## 2024-05-09 RX ORDER — BUDESONIDE, GLYCOPYRROLATE, AND FORMOTEROL FUMARATE 160; 9; 4.8 UG/1; UG/1; UG/1
2 AEROSOL, METERED RESPIRATORY (INHALATION) 2 TIMES DAILY
Qty: 1 EACH | Refills: 11 | Status: SHIPPED | OUTPATIENT
Start: 2024-05-09

## 2024-05-09 RX ORDER — LEVOFLOXACIN 500 MG/1
500 TABLET, FILM COATED ORAL DAILY
COMMUNITY
Start: 2024-05-08

## 2024-05-09 RX ORDER — PREDNISONE 20 MG/1
20 TABLET ORAL DAILY
COMMUNITY
Start: 2024-05-08 | End: 2024-05-13

## 2024-05-09 RX ORDER — BUDESONIDE, GLYCOPYRROLATE, AND FORMOTEROL FUMARATE 160; 9; 4.8 UG/1; UG/1; UG/1
2 AEROSOL, METERED RESPIRATORY (INHALATION) 2 TIMES DAILY
Qty: 2 EACH | Refills: 0 | COMMUNITY
Start: 2024-05-09 | End: 2024-05-10

## 2024-06-14 ENCOUNTER — HOSPITAL ENCOUNTER (OUTPATIENT)
Dept: RESPIRATORY THERAPY | Facility: HOSPITAL | Age: 59
Discharge: HOME | End: 2024-06-14
Payer: MEDICARE

## 2024-06-14 DIAGNOSIS — R05.3 CHRONIC COUGH: ICD-10-CM

## 2024-06-14 DIAGNOSIS — R06.09 DOE (DYSPNEA ON EXERTION): ICD-10-CM

## 2024-06-14 DIAGNOSIS — J41.1 MUCOPURULENT CHRONIC BRONCHITIS: ICD-10-CM

## 2024-06-14 DIAGNOSIS — Z72.0 TOBACCO ABUSE: ICD-10-CM

## 2024-06-14 PROCEDURE — 94729 DIFFUSING CAPACITY: CPT

## 2024-06-14 PROCEDURE — 94726 PLETHYSMOGRAPHY LUNG VOLUMES: CPT

## 2024-06-14 PROCEDURE — 94618 PULMONARY STRESS TESTING: CPT

## 2024-06-14 PROCEDURE — 94060 EVALUATION OF WHEEZING: CPT

## 2024-06-14 RX ORDER — ALBUTEROL SULFATE 2.5 MG/3ML
2.5 SOLUTION RESPIRATORY (INHALATION) ONCE
Status: COMPLETED | OUTPATIENT
Start: 2024-06-14 | End: 2024-06-14

## 2024-06-14 RX ADMIN — ALBUTEROL SULFATE 2.5 MG: 2.5 SOLUTION RESPIRATORY (INHALATION) at 11:05

## 2024-06-14 NOTE — PROGRESS NOTES
Exercise Oximetry    Patient Name:Jose Rafael Ortiz   MRN: [id number]   Date: 06/14/24             ROOM AIR BASELINE   SpO2% 96   Heart Rate 84   Blood Pressure      EXERCISE ON ROOM AIR SpO2% EXERCISE ON O2 @  LPM SpO2%   1 MINUTE 95 1 MINUTE    2 MINUTES 95 2 MINUTES    3 MINUTES 94 3 MINUTES    4 MINUTES 95 4 MINUTES    5 MINUTES 95 5 MINUTES    6 MINUTES 95 6 MINUTES               Distance Walked  600 feet Distance Walked   Dyspnea (Moreno Scale)  3 Dyspnea (Moreno Scale)   Fatigue (Moreno Scale)  3 Fatigue (Moreno Scale)   SpO2% Post Exercise  96 SpO2% Post Exercise   HR Post Exercise  94 HR Post Exercise   Time to Recovery   Time to Recovery     Comments:

## 2024-06-19 ENCOUNTER — HOSPITAL ENCOUNTER (OUTPATIENT)
Dept: CT IMAGING | Facility: HOSPITAL | Age: 59
Discharge: HOME | End: 2024-06-19
Payer: MEDICARE

## 2024-06-27 ENCOUNTER — TELEPHONE (OUTPATIENT)
Dept: PULMONOLOGY | Facility: CLINIC | Age: 59
End: 2024-06-27

## 2024-06-27 NOTE — TELEPHONE ENCOUNTER
Caller: Jose Rafael Ortiz    Relationship to patient: Self    Best call back number:     [phone] (Mobile)       Patient is needing: PT IS NEEDING ORDERS FOR CT SCAN TO BE SENT TO [facility]  PH: [phone]  THEY ONLY WANT TO HAVE CT DONE THERE

## 2024-07-16 NOTE — PROGRESS NOTES
Primary Care Provider  Jose A Bello MD     Referring Provider  No ref. provider found     Chief Complaint  Cough, Shortness of Breath, Wheezing, Asthma, and Follow-up    Subjective          History of Presenting Illness  Patient is a 59-year-old male, patient of Dr. Ryan who presents for management of dyspnea and COPD who presents for a follow-up visit today.  Since last office visit patient had a pulmonary function test and 6-minute walk test completed on 6/14/2024.  Pulmonary function test report states mild airflow obstruction with no bronchodilator response present.  Air-trapping present.  Diffusion capacity mildly reduced.  Patient's 6-minute walk test did not show any significant desaturations on room air. Patient had a low-dose chest CT scan completed at [facility] on 7/9/2024.  Report states no suspicious nodules.  Patient states that he does get short of breath that is worse with exertion, moderate severity, and improved with rest.  Patient states that he does have a cough and at times it is hard to cough up secretions.  Patient states that he is taking Breztri, however does admit to missing doses at times and uses albuterol inhaler and albuterol nebulizer treatments as needed.  Patient states that he is still smoking and will continue to try to cut back on his own.  Patient is not ready to set a quit date at this time.  Patient denies fever, chills, night sweats, swollen glands in the head and neck, unintentional weight loss, hemoptysis, purulent sputum production, dysphagia, chest pain, palpitations, chest tightness, abdominal pain, nausea, vomiting, and diarrhea.  Patient also denies any myalgias, changes in sense of taste and/or smell, sore throat, any other coronavirus or flu-like symptoms.  Patient denies any leg swelling, orthopnea, paroxysmal nocturnal dyspnea.  Patient is able to perform activities of daily living.        Review of Systems     Family History   Problem Relation  Age of Onset    Malig Hyperthermia Neg Hx         Social History     Socioeconomic History    Marital status:    Tobacco Use    Smoking status: Every Day     Current packs/day: 0.50     Average packs/day: 0.5 packs/day for 44.6 years (22.3 ttl pk-yrs)     Types: Cigarettes     Start date: 1980     Passive exposure: Past    Smokeless tobacco: Never    Tobacco comments:     INST PER ANESTHESIA PROTOCOL   Vaping Use    Vaping status: Never Used   Substance and Sexual Activity    Alcohol use: Not Currently     Comment: NONE SINCE 2014    Drug use: Never    Sexual activity: Defer        Past Medical History:   Diagnosis Date    Arthritis     AVN (avascular necrosis of bone)     R HIIP    COPD (chronic obstructive pulmonary disease)     Diabetes mellitus     Hyperlipidemia     Hypertension     Pre-diabetes     Smoker         Immunization History   Administered Date(s) Administered    31-influenza Vac Quardvalent Preservativ 10/28/2019, 12/09/2020, 01/24/2022, 11/14/2023    Influenza Inj MDCK Preserative Free 01/18/2017    Pneumococcal Conjugate 13-Valent (PCV13) 12/09/2020    Tdap 04/21/2023       Allergies   Allergen Reactions    Benadryl [Diphenhydramine] Anaphylaxis    Aspirin GI Intolerance    Ct Contrast Nausea And Vomiting          Current Outpatient Medications:     albuterol (PROVENTIL) (2.5 MG/3ML) 0.083% nebulizer solution, Take 2.5 mg by nebulization Every 4 (Four) Hours As Needed for Wheezing or Shortness of Air for up to 30 days., Disp: 180 each, Rfl: 5    atenolol (TENORMIN) 50 MG tablet, , Disp: , Rfl:     Budeson-Glycopyrrol-Formoterol (Breztri Aerosphere) 160-9-4.8 MCG/ACT aerosol inhaler, Inhale 2 puffs 2 (Two) Times a Day. Rinse mouth out after each use, Disp: 1 each, Rfl: 11    cetirizine (zyrTEC) 10 MG tablet, cetirizine 10 mg oral tablet take 1 tablet (10 mg) by oral route once daily   Active, Disp: , Rfl:     ibuprofen (ADVIL,MOTRIN) 800 MG tablet, LAST DOSE 12/6/23, Disp: , Rfl:      "losartan-hydrochlorothiazide (HYZAAR) 50-12.5 MG per tablet, Take 1 tablet by mouth Daily. LAST DOSE 12/13/23, Disp: , Rfl:     metFORMIN (GLUCOPHAGE) 500 MG tablet, Take 1 tablet by mouth Daily With Breakfast. LAST DOSE 12/13/23, Disp: , Rfl:     omeprazole (priLOSEC) 20 MG capsule, , Disp: , Rfl:     Ventolin  (90 Base) MCG/ACT inhaler, Inhale 2 puffs Every 4 (Four) Hours As Needed for Wheezing or Shortness of Air for up to 30 days., Disp: 18 g, Rfl: 5    Crestor 20 MG tablet, Daily. (Patient not taking: Reported on 7/24/2024), Disp: , Rfl:     sodium chloride 3 % nebulizer solution, Take 4 mL by nebulization 2 (Two) Times a Day for 30 days., Disp: 240 mL, Rfl: 5    traMADol (ULTRAM) 50 MG tablet, Take 1 tablet by mouth Every 4 (Four) Hours As Needed for Moderate Pain. (Patient not taking: Reported on 7/24/2024), Disp: 30 tablet, Rfl: 0     Objective     Physical Exam  Vital Signs:   WDWN, Alert, NAD.    HEENT:  PERRL, EOMI.  OP, nares clear, no sinus tenderness  Neck:  Supple, no JVD, no thyromegaly.  Lymph: no axillary, cervical, supraclavicular lymphadenopathy noted bilaterally  Chest:   good aeration, coarse rhonchi bilaterally, tympanic to percussion bilaterally, no work of breathing noted   CV: RRR, no MGR, pulses 2+, equal.  Abd:  Soft, NT, ND, + BS, no HSM  EXT:  no clubbing, no cyanosis, no edema, no joint tenderness  Neuro:  A&Ox3, CN grossly intact, no focal deficits.  Skin: No rashes or lesions noted.    /58 (BP Location: Right arm, Patient Position: Sitting, Cuff Size: Large Adult)   Pulse 74   Temp 97.8 °F (36.6 °C) (Oral)   Resp 16   Ht 170.2 cm (67.01\")   Wt 99.8 kg (220 lb)   SpO2 92% Comment: room air  BMI 34.45 kg/m²         Result Review :   I have reviewed Dr. Lion's last office visit note.  I also reviewed pulmonary function test report and 6-minute walk test report dated from 6/14/2024.  I also reviewed low-dose chest CT scan report completed at [facility] dated " from 7/9/2024.  See scanned reports.    Procedures:      XR Chest PA & Lateral    Result Date: 5/2/2024  No acute process.  Electronically Signed By-Bharat Liz MD On:5/2/2024 3:53 PM          Assessment and Plan      Assessment:  1.  Mild COPD.  FEV1 of 73% predicted.  Alpha-1 testing normal.  Chronic bronchitis phenotype.  2.  Chronic dyspnea.  3.  Chronic cough.  4.  Peripheral eosinophilia.  5.  Airway clearance impairment.   6.  Tobacco abuse cigarettes ongoing.  Patient is enrolled in lung cancer screening.          Plan:  1.  For airway clearance impairment, will prescribe patient a flutter valve with sodium chloride nebulizer treatments.  Did discuss with patient that I could send him for bronchoscopy, however patient prefers to try the flutter valve and the sodium chloride nebulizer treatments first.  2.  Patient reports that he does miss doses of Breztri.  Patient is advised to take Breztri 2 puffs twice daily as prescribed and rinse mouth out after each use.  Medication compliance discussed with patient in the office today.  Risks of not taking medications as prescribed discussed with the patient.  Patient verbalized understanding and compliance. Patient is advised to take all medications as prescribed.  3.  Continue albuterol inhaler and albuterol nebulizer treatments as needed.  4.   Patient will be due for repeat low-dose chest CT scan in July 2025.  Order placed today.  5.  Vaccination status:  patient reports they are up-to-date with flu and pneumonia vaccines.  Patient declines COVID-19 vaccination.  Discussed with patient the benefits of vaccination including decreased risk of severe illness, hospitalization and death related to flu, pneumonia, and COVID-19.  Patient verbalized understanding.  Patient is advised to continue to follow CDC recommendations such as social distancing, wearing a mask, and washing hands for least 20 seconds.  6.  Smoking status: patient is a current cigarette smoker.  I  counseled the patient on smoking cessation.  I counseled the patient on the risks of continued smoking including the risk of lung cancer, head and neck cancer, renal cancer, heart disease, stroke, and early death.  Patient refuses nicotine replacement therapy or pharmacotherapy at this time.  Patient is advised to decrease the number of cigarettes they are smoking up until the point to where they can quit.  7.  Patient to call the office, 911, or go to the ER with new or worsening symptoms.  8.  Follow-up in 4 weeks, sooner if needed.              Follow Up   Return in about 4 weeks (around 8/21/2024).  Patient was given instructions and counseling regarding his condition or for health maintenance advice. Please see specific information pulled into the AVS if appropriate.

## 2024-07-18 ENCOUNTER — TELEPHONE (OUTPATIENT)
Dept: PULMONOLOGY | Facility: CLINIC | Age: 59
End: 2024-07-18
Payer: MEDICARE

## 2024-07-18 NOTE — TELEPHONE ENCOUNTER
Patient had LDCT scan done at [facility] on 07-. Report scanned into chart under media. Patient requesting results. Thank you.

## 2024-07-18 NOTE — TELEPHONE ENCOUNTER
Spoke to Dorie who is on patients verbal release form she is aware of results and follow up appointment.

## 2024-07-24 ENCOUNTER — TELEPHONE (OUTPATIENT)
Dept: PULMONOLOGY | Facility: CLINIC | Age: 59
End: 2024-07-24

## 2024-07-24 ENCOUNTER — OFFICE VISIT (OUTPATIENT)
Dept: PULMONOLOGY | Facility: CLINIC | Age: 59
End: 2024-07-24
Payer: MEDICARE

## 2024-07-24 VITALS
HEART RATE: 74 BPM | TEMPERATURE: 97.8 F | OXYGEN SATURATION: 92 % | WEIGHT: 220 LBS | SYSTOLIC BLOOD PRESSURE: 129 MMHG | HEIGHT: 67 IN | RESPIRATION RATE: 16 BRPM | BODY MASS INDEX: 34.53 KG/M2 | DIASTOLIC BLOOD PRESSURE: 58 MMHG

## 2024-07-24 DIAGNOSIS — R06.09 DOE (DYSPNEA ON EXERTION): ICD-10-CM

## 2024-07-24 DIAGNOSIS — R06.09 CHRONIC DYSPNEA: ICD-10-CM

## 2024-07-24 DIAGNOSIS — Z72.0 TOBACCO ABUSE: ICD-10-CM

## 2024-07-24 DIAGNOSIS — F17.210 CIGARETTE NICOTINE DEPENDENCE WITHOUT COMPLICATION: ICD-10-CM

## 2024-07-24 DIAGNOSIS — J44.9 CHRONIC OBSTRUCTIVE PULMONARY DISEASE, UNSPECIFIED COPD TYPE: Primary | ICD-10-CM

## 2024-07-24 DIAGNOSIS — D72.19 PERIPHERAL EOSINOPHILIA: ICD-10-CM

## 2024-07-24 DIAGNOSIS — J41.1 MUCOPURULENT CHRONIC BRONCHITIS: ICD-10-CM

## 2024-07-24 DIAGNOSIS — R05.3 CHRONIC COUGH: ICD-10-CM

## 2024-07-24 PROCEDURE — 99214 OFFICE O/P EST MOD 30 MIN: CPT | Performed by: NURSE PRACTITIONER

## 2024-07-24 PROCEDURE — 1160F RVW MEDS BY RX/DR IN RCRD: CPT | Performed by: NURSE PRACTITIONER

## 2024-07-24 PROCEDURE — 3078F DIAST BP <80 MM HG: CPT | Performed by: NURSE PRACTITIONER

## 2024-07-24 PROCEDURE — 1159F MED LIST DOCD IN RCRD: CPT | Performed by: NURSE PRACTITIONER

## 2024-07-24 PROCEDURE — 3074F SYST BP LT 130 MM HG: CPT | Performed by: NURSE PRACTITIONER

## 2024-07-24 RX ORDER — ALBUTEROL SULFATE 90 UG/1
2 AEROSOL, METERED RESPIRATORY (INHALATION) EVERY 4 HOURS PRN
Qty: 18 G | Refills: 5 | Status: SHIPPED | OUTPATIENT
Start: 2024-07-24 | End: 2024-08-23

## 2024-07-24 RX ORDER — SODIUM CHLORIDE FOR INHALATION 3 %
4 VIAL, NEBULIZER (ML) INHALATION
Qty: 240 ML | Refills: 5 | Status: SHIPPED | OUTPATIENT
Start: 2024-07-24 | End: 2024-08-23

## 2024-07-24 RX ORDER — ALBUTEROL SULFATE 2.5 MG/3ML
2.5 SOLUTION RESPIRATORY (INHALATION) EVERY 4 HOURS PRN
Qty: 180 EACH | Refills: 5 | Status: SHIPPED | OUTPATIENT
Start: 2024-07-24 | End: 2024-08-23

## 2024-07-24 RX ORDER — BUDESONIDE, GLYCOPYRROLATE, AND FORMOTEROL FUMARATE 160; 9; 4.8 UG/1; UG/1; UG/1
2 AEROSOL, METERED RESPIRATORY (INHALATION) 2 TIMES DAILY
Qty: 1 EACH | Refills: 11 | Status: SHIPPED | OUTPATIENT
Start: 2024-07-24

## 2024-07-26 ENCOUNTER — TELEPHONE (OUTPATIENT)
Dept: PULMONOLOGY | Facility: CLINIC | Age: 59
End: 2024-07-26

## 2024-07-26 NOTE — TELEPHONE ENCOUNTER
I called pt mother, Dorie, whom is on pt verbal release form. Dorie was informed it was not a sputum order that was placed. Jessica place a flutter valve order. Dorie was informed the order will be sent to a "[employer]" and they will contact her. Dorie was also informed it is a breathing device.

## 2024-07-26 NOTE — TELEPHONE ENCOUNTER
Caller: BRANDIE SALGADO    Relationship to patient: Mother    Best call back number: [phone] (home)       Patient is needing: WANTS TO KNOW IF HE IS SUPPOSED TO BE DOING A EXPECTORANT TEST AT HOSPITAL? HE DOESN'T HAVE ORDERS AND NO PAPER WORK FOR IT. PLEASE CALL PT TO ADVISE.

## 2024-12-07 PROBLEM — N48.1 BALANITIS: Status: ACTIVE | Noted: 2024-12-07

## 2025-04-29 ENCOUNTER — TELEPHONE (OUTPATIENT)
Dept: UROLOGY | Age: 60
End: 2025-04-29
Payer: MEDICARE

## 2025-04-29 NOTE — TELEPHONE ENCOUNTER
Called Dr. KATERINA Bello's office and spoke with Emani and let her know that a new pt referral was sent to us on 9/26/24 for Balanitis, and pt was scheduled with Dr. Arevalo on 12/9/24 and the pt no showed that appt, and then was rescheduled on 12/31/24 with Dr. Arevalo on 12/31/24 and cancelled that appt. Pt again rescheduled for 4/23/25 with KAYLA Recinos on 4/23/25 and no showed that appt. Emani said she would let Dr. Bello know, and I verbalized understanding and thanked her.

## 2025-07-30 ENCOUNTER — TELEPHONE (OUTPATIENT)
Dept: UROLOGY | Age: 60
End: 2025-07-30
Payer: MEDICARE

## 2025-07-30 NOTE — TELEPHONE ENCOUNTER
Hub staff attempted to follow warm transfer process and was unsuccessful     Caller: BRANDIE SALGADO    Relationship to patient: MOTHER    Best call back number: [phone]    Patient is needing: PT'S MOTHER WAS RELAYED MESSAGE SENT BY EDUIN MCFARLANE AT 12:38 P.M.  MOTHER HAS CLARIFYING QUESTIONS AND WOULD LIKE TO SPEAK TO SOMEONE IN THE OFFICE.

## 2025-07-30 NOTE — TELEPHONE ENCOUNTER
CALLED PT MOM BACK AND EXPLAINED THAT PT WILL NEED TO SEE HIS PCP AGAIN DO TO PT HAVING OTHER ISSUES/PT MOM VOICED UNDERSTANDING

## 2025-07-30 NOTE — TELEPHONE ENCOUNTER
Hub staff attempted to follow warm transfer process and was unsuccessful     Caller: BRANDIE SALGADO     Relationship to patient: MOTHER    Best call back number: [phone]     Patient is needing: PT'S MOTHER CALLING, HE HAS NOT HAD A NEW PT APPT YET. SHE SAYS HIS ISSUE IS WORSENING AND HE NEEDS TO BE SEEN ASAP. NO APPT'S AVAILABLE UNTIL THE END OF AUGUST.     PLEASE REVIEW AND CALL PT'S MOM TO SCHEDULE.

## 2025-07-30 NOTE — TELEPHONE ENCOUNTER
CALLED PT MOM BACK TO EXPLAIN THAT PT WILL NEED TO GO BACK TO HIS PCP DUE TO PT HAS CX'D 2 APPTS AND NO SHOWED 2 APPTS WITH US/LMOM/OK FOR HUB TO RELAY MESSAGE